# Patient Record
Sex: FEMALE | Race: BLACK OR AFRICAN AMERICAN | URBAN - METROPOLITAN AREA
[De-identification: names, ages, dates, MRNs, and addresses within clinical notes are randomized per-mention and may not be internally consistent; named-entity substitution may affect disease eponyms.]

---

## 2018-08-21 ENCOUNTER — OFFICE VISIT (OUTPATIENT)
Dept: NEUROLOGY | Age: 23
End: 2018-08-21

## 2018-08-21 VITALS
SYSTOLIC BLOOD PRESSURE: 138 MMHG | DIASTOLIC BLOOD PRESSURE: 90 MMHG | HEART RATE: 72 BPM | HEIGHT: 63 IN | BODY MASS INDEX: 35.97 KG/M2 | OXYGEN SATURATION: 99 % | WEIGHT: 203 LBS

## 2018-08-21 DIAGNOSIS — Z87.898 HISTORY OF SNORING: ICD-10-CM

## 2018-08-21 RX ORDER — NORTRIPTYLINE HYDROCHLORIDE 10 MG/1
10 CAPSULE ORAL
Qty: 30 CAP | Refills: 2 | Status: SHIPPED | OUTPATIENT
Start: 2018-08-21 | End: 2018-10-24 | Stop reason: SDUPTHER

## 2018-08-21 RX ORDER — GLUCOSAMINE/CHONDR SU A SOD 750-600 MG
TABLET ORAL
COMMUNITY

## 2018-08-21 NOTE — PROGRESS NOTES
NEUROSCIENCE [place]   NEW PATIENT EVALUATION/CONSULTATION       PATIENT NAME: Ayse Starkey    MRN: [id number]    REASON FOR CONSULTATION: Headaches    08/21/18      Previous records (physician notes, laboratory reports, and radiology reports) and imaging studies were reviewed and summarized. My recommendations will be communicated back to the patient's physician(s) via electronic medical record and/or by [address] [facility] [facility] mail. HISTORY OF PRESENT ILLNESS:  Ayse Starkey is a 21 y.o. right handed female presenting for evaluation of headaches. Headaches present since 2012. She states she experienced a syncopal episode while at work in 2012 without head injury. She was walking at the time but was caught by someone standing behind her with LOC x 2 minutes at most.  No associated convulsions or urinary incontinence. Denies post event confusion or fatigue. Ever since this event she has experienced headaches intermittently. No further syncopal episodes since this time. She was seen at [facility] initially and dx with anemia. Location: L frontal  Character: pressure  Intensity: On average 8/10  Frequency: Daily x 3 months  # HA free days per month: 0  Duration: 2-3 hours  Aura: None  Associated Sx with HA: no nausea/vomiting, +phonophotophobia. Denies unilateral ptosis, conjunctival injection, lacrimation, sweating  Neurological ROS: Denies focal weakness, numbness or vision loss associated with headaches. Systemic ROS:   Caffeine use: None  H/O Head trauma: None  Depressive or anxiety Sx: Yes    Any change in pattern of HA? See above    Triggers: None  Alleviating factors: Sleep/rest  FHx HA/migraine:  Mother  +H/O snoring  Treatment so far: BC powder or ibuprofen 2-3x weekly- helpful temporarily    Investigations so far: None      PAST MEDICAL HISTORY:  Syncope    PAST SURGICAL HISTORY:  Past Surgical History:   Procedure Laterality Date    HX WRIST FRACTURE TX         FAMILY HISTORY:   Family History   Problem Relation Age of Onset    Stroke Father     Cancer Maternal Aunt          SOCIAL HISTORY:  Social History     Social History    Marital status: SINGLE     Spouse name: N/A    Number of children: N/A    Years of education: N/A     Social History Main Topics    Smoking status: Never Smoker    Smokeless tobacco: Never Used    Alcohol use No    Drug use: Not on file    Sexual activity: Not on file     Other Topics Concern    Not on file     Social History Narrative    No narrative on file         MEDICATIONS:   Current Outpatient Prescriptions   Medication Sig Dispense Refill    multivitamin, tx-iron-ca-min (THERA-M W/ IRON) 9 mg iron-400 mcg tab tablet Take 1 Tab by mouth daily.  Biotin 2,500 mcg cap Take  by mouth. ALLERGIES:  No Known Allergies      REVIEW OF SYSTEMS:  10 point ROS reviewed with patient. Please see scanned document under media. PHYSICAL EXAM:  Vital Signs:   Visit Vitals    /90    Pulse 72    Ht 5' 3\" (1.6 m)    Wt 92.1 kg (203 lb)    SpO2 99%    BMI 35.96 kg/m2        General Medical Exam:  General:  Well appearing, comfortable, in no apparent distress. Eyes/ENT: see cranial nerve examination. Neck: No masses appreciated. Full range of motion without tenderness. Respiratory:  Clear to auscultation, good air entry bilaterally. Cardiac:  Regular rate and rhythm, no murmur. GI:  Soft, non-tender, non-distended abdomen. Bowel sounds normal. No masses, organomegaly. Extremities:  No deformities, edema, or skin discoloration. Skin:  No rashes or lesions. Neurological:  · Mental Status:  Alert and oriented to person, place, and time with fluent speech. · Cranial Nerves:   CNII/III/IV/VI: Optic disc w/clear margins b/l, visual fields full to confrontation, EOMI, PERRL, no ptosis or nystagmus.    CN V: Facial sensation intact bilaterally, masseter 5/5   CN VII: Facial muscles symmetric and strong   CN VIII: Hears finger rub well bilaterally, intact vestibular function   CN IX/X: Normal palatal movement   CN XI: Full strength shoulder shrug bilaterally   CN XII: Tongue protrusion full and midline without fasciculation or atrophy  · Motor: Normal tone and muscle bulk with no pronator drift. No atrophy or fasciculations present on examination. Individual muscle group testing:  Shoulder abduction:   Left:5/5   Right : 5/5    Shoulder adduction:   Left:5/5   Right : 5/5    Elbow flexion:      Left:5/5   Right : 5/5  Elbow extension:    Left:5/5   Right : 5/5   Wrist flexion:    Left:5/5   Right : 5/5  Wrist extension:    Left:5/5   Right : 5/5  Arm pronation:   Left:5/5   Right : 5/5  Arm supination:   Left:5/5   Right : 5/5    Finger flexion:    Left:5/5   Right : 5/5    Finger extension:   Left:5/5   Right : 5/5   Finger abduction:  Left:5/5   Right : 5/5   Finger adduction:   Left:5/5   Right : 5/5  Hip flexion:     Left:5/5   Right : 5/5         Hip extension:   Left:5/5   Right : 5/5    Knee flexion:     Left:5/5   Right : 5/5    Knee extension:   Left:5/5   Right : 5/5    Dorsiflexion:     Left:5/5   Right : 5/5  Plantar flexion:    Left:5/5   Right : 5/5      · MSRs: No crossed adductors or clonus. RIGHT  LEFT   Brachioradialis 2+ 2+   Biceps 2+ 2+   Triceps 2+ 2+   Knee 2+ 2+   Achilles 1+ 1+        Plantar response Downward Downward          · Sensation: Normal and symmetric perception of pinprick, temperature, light touch, proprioception, and vibration; (-) Romberg. · Coordination: No dysmetria. Normal rapid alternating movements; finger-to-nose and heel-to- shin testing are within normal limits. · Gait: Normal native gait. ASSESSMENT:      ICD-10-CM ICD-9-CM    1. Chronic migraine G43.709 346.70 SLEEP MEDICINE REFERRAL   2. History of snoring Z87.898 V15.89 SLEEP MEDICINE REFERRAL   21year old AAF presenting with increasing frequency of migraines w/o aura for the past several months (HA onset 2012).     Neurological examination is non-focal and without evidence of papilledema. She admits to recent weight gain and snoring- will refer to sleep medicine for BRITTANEY evaluation as this may be contributing to her migraines. Discussed options for migraine ppx including TCA therapy and potential side effects as well as natural supplements/Mg. She elects to start a trial of Nortriptyline. Headache education  Discussed pathophysiology of headache. Discussed use of headache diary. Discussed treatment options, both abortive and preventive medications. Instructed patient about medications and potential side effects. Discussed medication overuse headache and to limit use of analgesics to less than 2 doses per week. Discussed natural supplements including Mg      PLAN:  · Sleep medicine referral to evaluate for BRITTANEY  · Start Nortriptyline 10mg qhs for migraine ppx  · Limit use of OTC analgesics to no more than twice weekly to avoid rebound headaches    Follow-up Disposition:  Return in about 2 months (around 10/21/2018). Prince René Quinn DO  Staff Neurologist  Diplomate, [employer]iatry & Neurology

## 2018-08-21 NOTE — PROGRESS NOTES
Excedrin doesn't work, ibuprofen or BC powder works but takes about an hour to kick in. Chief Complaint   Patient presents with    Headache     occurs daily for \"years\" but they have gotten more frequent 2-3 months ago. They last about 2-3 hours.       Visit Vitals    /90    Pulse 72    Ht 5' 3\" (1.6 m)    Wt 92.1 kg (203 lb)    SpO2 99%    BMI 35.96 kg/m2

## 2018-08-22 ENCOUNTER — DOCUMENTATION ONLY (OUTPATIENT)
Dept: NEUROLOGY | Age: 23
End: 2018-08-22

## 2018-09-21 ENCOUNTER — TELEPHONE (OUTPATIENT)
Dept: NEUROLOGY | Age: 23
End: 2018-09-21

## 2018-09-21 NOTE — TELEPHONE ENCOUNTER
----- Message from 56Nelly Sheth sent at 9/21/2018  9:50 AM EDT -----  Regarding: Dr. Elie You  Contact: [phone]  [facility] Y[facility] advised the Nortriptyline is helping with migraines but potentially causes UTI. Pt is requesting a substitute rx that does not cause the side effect. Please send an updated medication to the [facility] on file.

## 2018-09-24 NOTE — TELEPHONE ENCOUNTER
Spoke with patient, informed her that the medication cause urinary frequency but not UTI per . She states she did have a UTI but caused by something else, will keep current appointment with .

## 2018-10-09 ENCOUNTER — OFFICE VISIT (OUTPATIENT)
Dept: ENDOCRINOLOGY | Age: 23
End: 2018-10-09

## 2018-10-09 ENCOUNTER — TELEPHONE (OUTPATIENT)
Dept: ENDOCRINOLOGY | Age: 23
End: 2018-10-09

## 2018-10-09 VITALS
OXYGEN SATURATION: 97 % | SYSTOLIC BLOOD PRESSURE: 113 MMHG | RESPIRATION RATE: 16 BRPM | BODY MASS INDEX: 35.08 KG/M2 | WEIGHT: 198 LBS | HEART RATE: 76 BPM | HEIGHT: 63 IN | DIASTOLIC BLOOD PRESSURE: 76 MMHG

## 2018-10-09 DIAGNOSIS — E66.9 OBESITY (BMI 30-39.9): ICD-10-CM

## 2018-10-09 DIAGNOSIS — E28.2 PCOS (POLYCYSTIC OVARIAN SYNDROME): Primary | ICD-10-CM

## 2018-10-09 RX ORDER — ADAPALENE AND BENZOYL PEROXIDE .1; 2.5 G/100G; G/100G
GEL TOPICAL
COMMUNITY
Start: 2018-09-14

## 2018-10-09 RX ORDER — ALBUTEROL SULFATE 90 UG/1
AEROSOL, METERED RESPIRATORY (INHALATION)
COMMUNITY

## 2018-10-09 RX ORDER — MONTELUKAST SODIUM 10 MG/1
TABLET ORAL
COMMUNITY

## 2018-10-09 RX ORDER — KETOCONAZOLE 20 MG/ML
SHAMPOO TOPICAL
COMMUNITY
Start: 2018-09-13

## 2018-10-09 RX ORDER — NORGESTIMATE AND ETHINYL ESTRADIOL 0.25-0.035
KIT ORAL
COMMUNITY
Start: 2012-08-10 | End: 2018-10-09

## 2018-10-09 RX ORDER — FLUOCINOLONE ACETONIDE 0.1 MG/ML
SOLUTION TOPICAL
COMMUNITY
Start: 2018-10-05

## 2018-10-09 RX ORDER — METFORMIN HYDROCHLORIDE 500 MG/1
500 TABLET, EXTENDED RELEASE ORAL 2 TIMES DAILY WITH MEALS
Qty: 60 TAB | Refills: 5 | Status: SHIPPED | OUTPATIENT
Start: 2018-10-09 | End: 2019-04-10 | Stop reason: SDUPTHER

## 2018-10-09 RX ORDER — PHENAZOPYRIDINE HYDROCHLORIDE 200 MG/1
TABLET, FILM COATED ORAL
COMMUNITY
Start: 2018-09-21

## 2018-10-09 RX ORDER — NITROFURANTOIN 25; 75 MG/1; MG/1
CAPSULE ORAL
COMMUNITY
Start: 2018-09-21 | End: 2018-10-09 | Stop reason: ALTCHOICE

## 2018-10-09 NOTE — PROGRESS NOTES
Endocrinology New Patient Visit    Chief Complaint: PCOS    OB/GYN: Dr Cody Gonzalez    History of Present Illness:  Kirsty Portillo is a 21 y.o. female who is self-referred for PCOS. She primarily wanted to see endocrinology because she would like to conceive in the near future and does not want to take OCPs anymore. Since being off them a year ago, she has had irregular cycles, about every 3 months. Labs done in July were notable for total testosterone of 57.9, 17OHP of 74, A1c of 5.0%, DHEA-S of 222, and TSH of 1.79l. She also had a TVUS in Jan, and she says it was \"normal\" without excessive cysts. Menarche was in middle school, around the same time as her peers. She had irregular cycles during her teen years, started OCPs in high school and this regulated her cycles. Cycles are now irregular (~Q3mo) off OCPs - has been off for the past year. She had a period in May and this lasted a full month and LMP was in August: started on the 15th and lasted a week. UPT has been negative but she has not taken one recently. Only has one female partner so is certain she is not pregnant. She would like to conceive at some point in the next few years, but has not talked to a reproductive endocrinologist yet. She reports temporal hair loss and acne, but no abnormal hair growth. A1c has been normal in the past - 5.0% in July. She has not taken metformin. She would like to lose weight - cut down on fried foods and has lost about 5 lbs. She is not exercising regularly.     Review of Systems: as above, otherwise a 10 pt review is negative     Problem List:  Patient Active Problem List   Diagnosis Code    PCOS (polycystic ovarian syndrome) E28.2       Past Medical History:  Past Medical History:   Diagnosis Date    PCOS (polycystic ovarian syndrome)        Past Surgical History:  Past Surgical History:   Procedure Laterality Date    HX WRIST FRACTURE TX         Social History:  Social History     Social History    Marital status: SINGLE     Spouse name: N/A    Number of children: N/A    Years of education: N/A     Occupational History    Not on file. Social History Main Topics    Smoking status: Never Smoker    Smokeless tobacco: Never Used    Alcohol use No    Drug use: No    Sexual activity: Not on file     Other Topics Concern    Not on file     Social History Narrative       Family History:  Family History   Problem Relation Age of Onset    Stroke Father     Cancer Maternal Aunt        Medications:     Current Outpatient Prescriptions:     albuterol (PROVENTIL HFA, VENTOLIN HFA, PROAIR HFA) 90 mcg/actuation inhaler, by Does not apply route.  , Disp: , Rfl:     ketoconazole (NIZORAL) 2 % shampoo, , Disp: , Rfl:     multivitamin, tx-iron-ca-min (THERA-M W/ IRON) 9 mg iron-400 mcg tab tablet, Take 1 Tab by mouth daily. , Disp: , Rfl:     Biotin 2,500 mcg cap, Take  by mouth., Disp: , Rfl:     nortriptyline (PAMELOR) 10 mg capsule, Take 1 Cap by mouth nightly., Disp: 30 Cap, Rfl: 2    norgestimate-ethinyl estradiol (ORTHO-CYCLEN, SPRINTEC) 0.25-35 mg-mcg tab, Take 1 tablet by mouth daily. , Disp: , Rfl:     montelukast (SINGULAIR) 10 mg tablet, Take 10 mg by mouth nightly.  , Disp: , Rfl:     adapalene-benzoyl peroxide 0.1-2.5 % glwp, , Disp: , Rfl:     fluocinoLONE (SYNALAR) 0.01 % external solution, , Disp: , Rfl:     phenazopyridine (PYRIDIUM) 200 mg tablet, , Disp: , Rfl:     Allergies:  No Known Allergies    Physical Examination:  Visit Vitals    /76    Pulse 76    Resp 16    Ht 5' 3\" (1.6 m)    Wt 198 lb (89.8 kg)    LMP 07/09/2018    SpO2 97%    BMI 35.07 kg/m2     Gen: no acute distress  HEENT: mucous membranes moist  Thyroid: no enlargement or nodules noted  CAD: normal rate, regular rhythm.  No murmur rubs or gallops  PULM: clear to ausculation, no wheezes, rhonchis or rales  EXT: no clubbing, cyanosis or edema  Neuro: grossly non focal  Skin: acne and acanthosis present, no obvious hirsutism  Psych: pleasant, good insight into her medical history      Clinical Data Review: There are no results available in [facility]. Pertinent lab results from outside records are transcribed in HPI and scanned into the medical record. Assessment and Plan:  [facility] is a 21 y.o. female here for PCOS. 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 She also has a testosterone elevated in the PCOS range. 17OH was normal which excludes CAH. Regarding treatment, we discussed the importance of weight reduction by intensive lifestyle changes to prevent the onset of diabetes as outlined in the DPP trial. Recommended both dietary modification (moderate low-carb diet) and increase in physical activity. Since she prefers to stay off OCPs and is considering fertility in the near future, I recommend trying metformin to see if this helps regulate her cycles. Although A1c was normal, she does have clinical evidence of insulin resistance (acanthosis) and I'm certain she would have abnormalities if an OGTT was pursued. - Start Metformin  mg BID   - Intensive lifestyle changes as listed above (gave a handout on low-carbohydrate diets)    Patient verbalized an understanding and will return to clinic in 6 months. I spent 45 minutes with the patient today and > 50% of the time was spent counseling the patient about treatment options for PCOS and weight loss strategies. Thank you for the opportunity to participate in this patient's care.     Adelaide Gaxiola MD  [facility]  [facility]

## 2018-10-09 NOTE — TELEPHONE ENCOUNTER
Will you let her know that she does NOT need to do the bloodwork I gave her? Dr Delphine Martinez faxed over her labs and the testosterone and 17OHP results were on there. Testosterone was elevated in the range expected for PCOS and 17OHP was normal (no evidence of congenital adrenal hyperplasia). Thanks!

## 2018-10-09 NOTE — MR AVS SNAPSHOT
[address] [facility] [address] 
[phone] Patient: Irvin Valencia MRN: [id number] :1995 Visit Information Date & Time Provider Department Dept. Phone Encounter #  
 10/9/2018  9:50 AM MD Ari Castillo Diabetes and Endocrinology-[facility] [phone] Your Appointments 10/24/2018  8:00 AM  
Follow Up with DO Wali De Leon [facility] at [facility]) Appt Note: 2 month f/u 18 SB  
 [address] [place] [id number]  
[phone]  
  
   
 [address] [place] [address]  [id number] Upcoming Health Maintenance Date Due  
 HPV Age 9Y-34Y (3 of 1 - Female 3 Dose Series) 3/19/2006 DTaP/Tdap/Td series (1 - Tdap) 3/19/2016 PAP AKA CERVICAL CYTOLOGY 3/19/2016 Influenza Age 5 to Adult 2018 Allergies as of 10/9/2018  Review Complete On: 10/9/2018 By: Deb Yusuf No Known Allergies Current Immunizations  Never Reviewed No immunizations on file. Not reviewed this visit You Were Diagnosed With   
  
 Codes Comments PCOS (polycystic ovarian syndrome)    -  Primary ICD-10-CM: E28.2 ICD-9-CM: 256.4 Obesity (BMI 30-39. 9)     ICD-10-CM: E66.9 ICD-9-CM: 278.00 Vitals BP Pulse Resp Height(growth percentile) Weight(growth percentile) LMP  
 113/76 76 16 5' 3\" (1.6 m) 198 lb (89.8 kg) 2018 SpO2 BMI OB Status Smoking Status 97% 35.07 kg/m2 Polycystic Ovarian Syndrome Never Smoker Vitals History BMI and BSA Data Body Mass Index Body Surface Area 35.07 kg/m 2 2 m 2 Preferred Pharmacy [facility], [address] [place] [address] [phone] Your Updated Medication List  
  
   
 This list is accurate as of 10/9/18 10:29 AM.  Always use your most recent med list.  
  
  
  
  
 adapalene-benzoyl peroxide 0.1-2.5 % Glwp  
  
 albuterol 90 mcg/actuation inhaler Commonly known as:  PROVENTIL HFA, VENTOLIN HFA, PROAIR HFA  
by Does not apply route. Biotin 2,500 mcg Cap Take  by mouth. fluocinoLONE 0.01 % external solution Commonly known as:  SYNALAR  
  
 ketoconazole 2 % shampoo Commonly known as:  NIZORAL  
  
 metFORMIN  mg tablet Commonly known as:  GLUCOPHAGE XR Take 1 Tab by mouth two (2) times daily (with meals). montelukast 10 mg tablet Commonly known as:  SINGULAIR Take 10 mg by mouth nightly. multivitamin, tx-iron-ca-min 9 mg iron-400 mcg Tab tablet Commonly known as:  THERA-M w/ IRON Take 1 Tab by mouth daily. norgestimate-ethinyl estradiol 0.25-35 mg-mcg Tab Commonly known as:  Josefina Renae Take 1 tablet by mouth daily. nortriptyline 10 mg capsule Commonly known as:  PAMELOR Take 1 Cap by mouth nightly. phenazopyridine 200 mg tablet Commonly known as:  PYRIDIUM Prescriptions Sent to Pharmacy Refills  
 metFORMIN ER (GLUCOPHAGE XR) 500 mg tablet 5 Sig: Take 1 Tab by mouth two (2) times daily (with meals). Class: Normal  
 Pharmacy: [facility] [address] #: [phone] Route: Oral  
  
We Performed the Following 17-OH PROGESTERONE LCMS [id number] CPT(R)] TESTOSTERONE, TOTAL, FEMALE/CHILD [id number] CPT(R)] Patient Instructions I recommend researching a low-carbohydrate diet as this way of eating can help improve your blood sugars and also help you lose weight. It can also help decrease your needs for diabetes medications including insulin. There are different types of low-carb diets: 
- Strict ketogenic/Atkins - typically less than 20-30 grams of carbs/day - Moderate low-carb/South Beach or Paleo - typically less than 60-75 grams of carbs/day Here are some resources you can use to educate yourself on low-carb diets: 1. Diet Doctor Website: 
 PickSeat.Max-Wellness. CipherCloud/diabetes 
 https://eMinor.org/ 2. The Keto Reset Diet by Jamil Huston 3. New Atkins for a New You by Diony Yun and Lin Grigsby 4. http://[employer] Wireless/blog/7-steps-to-healthy-low-carb-living/ 
5. A Low Carbohydrate, Ketogenic Diet Manual by Diony Yun 
 
================================================================== An outline of the basics of a moderate low-carb diet: 
  
AIM FOR LESS THAN 20-30 GRAMS OF NET CARBS PER MEAL Net carbs = total carbs (g) - fiber (g) Read food labels! Avoid food with added sugar or anything with more than 5g sugar per serving. Focus on eating mostly protein (meat, poultry, fish, shellfish, eggs), healthy fats (avocados, nuts, cheese, olive or coconut oil) and non-starchy vegetables (greens, carrots, tomatoes, bell peppers, broccoli, brussels sprouts, green beans, etc). If you fill yourself up with these foods, you won't even want the carbs. Minimize your fruit intake as much as possible, no more than one serving per day. When you do eat fruit, choose lower sugar options like fresh berries. 
  
BREAKFAST: whole eggs, veggies, omelet, plain yogurt, garcia, sausage, protein shake or low-carb protein bar (Atkins, Quest, etc) LUNCH: salad with meat/poultry, veggies, cheese, nuts; low-carb wrap with veggies and meat, soup with meat/veggies DINNER: any type of meat/poultry or seafood (without breading), non-starchy vegetables, carb substitutes like cauliflower rice or zucchini noodles 
  
SNACK OPTIONS: cheese (string cheese or individually wrapped snack size cheese), carrots and celery with Ranch or blue cheese dressing, nuts (almonds, pistachios, walnuts, etc), meat (snack size salami, ham, or turkey), pork rinds, Wandalee Ogden SWEETS (in moderation): dark chocolate (greater than 75% cocoa), low-sugar ice cream (Halo Top, Breyers or Alvaro's No Sugar Added) BEVERAGES: water, water, water Other options: unsweet tea (okay to add a pack of Splenda or Stevia if needed), sparkling water without calories (ex: [place], Nanci, etc), coffee (unsweetened creamer is fine) Alcohol: (always in moderation) - lower carb options include red or white wine and champagne (the drier, the better); light beers like 360Guanxielob Ultra; some liquors (just avoid the sweet mixers like juices and sodas) Introducing [facility]! [employer] introduces [employer] patient portal. Now you can access parts of your medical record, email your doctor's office, and request medication refills online. 1. In your internet browser, go to https://Yummy Garden Kids Eatery. Unlimited Concepts/Yummy Garden Kids Eatery 2. Click on the First Time User? Click Here link in the Sign In box. You will see the New Member Sign Up page. 3. Enter your [employer] Access Code exactly as it appears below. You will not need to use this code after youve completed the sign-up process. If you do not sign up before the expiration date, you must request a new code. · [employer] Access Code: [email or web address] Expires: 11/19/2018  9:31 AM 
 
4. Enter the last four digits of your Social Security Number (xxxx) and Date of Birth (mm/dd/yyyy) as indicated and click Submit. You will be taken to the next sign-up page. 5. Create a tweetTVt ID. This will be your [employer] login ID and cannot be changed, so think of one that is secure and easy to remember. 6. Create a [employer] password. You can change your password at any time. 7. Enter your Password Reset Question and Answer. This can be used at a later time if you forget your password. 8. Enter your e-mail address. You will receive e-mail notification when new information is available in [address] 9. Click Sign Up. You can now view and download portions of your medical record. 10. Click the Download Summary menu link to download a portable copy of your medical information. If you have questions, please visit the Frequently Asked Questions section of the Health News website. Remember, Health News is NOT to be used for urgent needs. For medical emergencies, dial 911. Now available from your iPhone and Android! Please provide this summary of care documentation to your next provider. If you have any questions after today's visit, please call [phone].

## 2018-10-09 NOTE — LETTER
NOTIFICATION RETURN TO WORK / SCHOOL 
 
10/9/2018 10:41 AM 
 
Ms. Irvin Kaiser [address] [facility] [address] [facility] [address] To Whom It May Concern: 
 
Lb Roldan had an appointment today at [facility]. Please excuse her from work. If there are questions or concerns please have the patient contact our office. Sincerely, Ayse Milligan MD

## 2018-10-09 NOTE — PATIENT INSTRUCTIONS
I recommend researching a low-carbohydrate diet as this way of eating can help improve your blood sugars and also help you lose weight. It can also help decrease your needs for diabetes medications including insulin. There are different types of low-carb diets:  - Strict ketogenic/Atkins - typically less than 20-30 grams of carbs/day  - Moderate low-carb/South Beach or Paleo - typically less than 60-75 grams of carbs/day    Here are some resources you can use to educate yourself on low-carb diets:  1. Diet Doctor Website:   [email or web address]/diabetes   https://IntuiLab.YogaTrail/  2. The Keto Reset Diet by Funmilayo Alva  3. New Atkins for a New You by Juan Gordon and Chaitanya Vale  4. http://Citizenside/blog/7-steps-to-healthy-low-carb-living/  5. A Low Carbohydrate, Ketogenic Diet Manual by Juan Gordon    ==================================================================  An outline of the basics of a moderate low-carb diet:     AIM FOR LESS THAN 20-30 GRAMS OF NET CARBS PER MEAL  Net carbs = total carbs (g) - fiber (g)   Read food labels! Avoid food with added sugar or anything with more than 5g sugar per serving. Focus on eating mostly protein (meat, poultry, fish, shellfish, eggs), healthy fats (avocados, nuts, cheese, olive or coconut oil) and non-starchy vegetables (greens, carrots, tomatoes, bell peppers, broccoli, brussels sprouts, green beans, etc). If you fill yourself up with these foods, you won't even want the carbs. Minimize your fruit intake as much as possible, no more than one serving per day.  When you do eat fruit, choose lower sugar options like fresh berries.     BREAKFAST: whole eggs, veggies, omelet, plain yogurt, garcia, sausage, protein shake or low-carb protein bar (Atkins, Quest, etc)  LUNCH: salad with meat/poultry, veggies, cheese, nuts; low-carb wrap with veggies and meat, soup with meat/veggies  DINNER: any type of meat/poultry or seafood (without breading), non-starchy vegetables, carb substitutes like cauliflower rice or zucchini noodles     SNACK OPTIONS: cheese (string cheese or individually wrapped snack size cheese), carrots and celery with Ranch or blue cheese dressing, nuts (almonds, pistachios, walnuts, etc), meat (snack size salami, ham, or turkey), pork rinds, jerkey    SWEETS (in moderation): dark chocolate (greater than 75% cocoa), low-sugar ice cream (Halo Top, Breyers or Alvaro's No Sugar Added)    BEVERAGES: water, water, water  Other options: unsweet tea (okay to add a pack of Splenda or Stevia if needed), sparkling water without calories (ex: [employer], Nanci, etc), coffee (unsweetened creamer is fine)  Alcohol: (always in moderation) - lower carb options include red or white wine and champagne (the drier, the better); light beers like DailyPathelSpoonfed Ultra; some liquors (just avoid the sweet mixers like juices and sodas)

## 2018-10-12 LAB
17OHP SERPL-MCNC: 62 NG/DL
TESTOST SERPL-MCNC: 49.1 NG/DL (ref 10–55)

## 2018-10-24 ENCOUNTER — OFFICE VISIT (OUTPATIENT)
Dept: NEUROLOGY | Age: 23
End: 2018-10-24

## 2018-10-24 VITALS
RESPIRATION RATE: 18 BRPM | WEIGHT: 199 LBS | BODY MASS INDEX: 35.26 KG/M2 | SYSTOLIC BLOOD PRESSURE: 116 MMHG | OXYGEN SATURATION: 97 % | DIASTOLIC BLOOD PRESSURE: 76 MMHG | HEIGHT: 63 IN | HEART RATE: 76 BPM

## 2018-10-24 DIAGNOSIS — G43.009 MIGRAINE WITHOUT AURA AND WITHOUT STATUS MIGRAINOSUS, NOT INTRACTABLE: Primary | ICD-10-CM

## 2018-10-24 DIAGNOSIS — Z87.898 HISTORY OF SNORING: ICD-10-CM

## 2018-10-24 RX ORDER — NORTRIPTYLINE HYDROCHLORIDE 10 MG/1
10 CAPSULE ORAL
Qty: 90 CAP | Refills: 1 | Status: SHIPPED | OUTPATIENT
Start: 2018-10-24 | End: 2019-05-27 | Stop reason: SDUPTHER

## 2018-10-24 NOTE — PROGRESS NOTES
Patient here for follow up on migraines. She reported she has two bad migraines since her last office visit. She also reported she gets a sharp pain in her head at times.

## 2018-10-24 NOTE — PATIENT INSTRUCTIONS
A Healthy Lifestyle: Care Instructions  Your Care Instructions    A healthy lifestyle can help you feel good, stay at a healthy weight, and have plenty of energy for both work and play. A healthy lifestyle is something you can share with your whole family. A healthy lifestyle also can lower your risk for serious health problems, such as high blood pressure, heart disease, and diabetes. You can follow a few steps listed below to improve your health and the health of your family. Follow-up care is a key part of your treatment and safety. Be sure to make and go to all appointments, and call your doctor if you are having problems. It's also a good idea to know your test results and keep a list of the medicines you take. How can you care for yourself at home? · Do not eat too much sugar, fat, or fast foods. You can still have dessert and treats now and then. The goal is moderation. · Start small to improve your eating habits. Pay attention to portion sizes, drink less juice and soda pop, and eat more fruits and vegetables. ? Eat a healthy amount of food. A 3-ounce serving of meat, for example, is about the size of a deck of cards. Fill the rest of your plate with vegetables and whole grains. ? Limit the amount of soda and sports drinks you have every day. Drink more water when you are thirsty. ? Eat at least 5 servings of fruits and vegetables every day. It may seem like a lot, but it is not hard to reach this goal. A serving or helping is 1 piece of fruit, 1 cup of vegetables, or 2 cups of leafy, raw vegetables. Have an apple or some carrot sticks as an afternoon snack instead of a candy bar. Try to have fruits and/or vegetables at every meal.  · Make exercise part of your daily routine. You may want to start with simple activities, such as walking, bicycling, or slow swimming. Try to be active 30 to 60 minutes every day. You do not need to do all 30 to 60 minutes all at once.  For example, you can exercise 3 times a day for 10 or 20 minutes. Moderate exercise is safe for most people, but it is always a good idea to talk to your doctor before starting an exercise program.  · Keep moving. Lynda Floss the lawn, work in the garden, or ChatID. Take the stairs instead of the elevator at work. · If you smoke, quit. People who smoke have an increased risk for heart attack, stroke, cancer, and other lung illnesses. Quitting is hard, but there are ways to boost your chance of quitting tobacco for good. ? Use nicotine gum, patches, or lozenges. ? Ask your doctor about stop-smoking programs and medicines. ? Keep trying. In addition to reducing your risk of diseases in the future, you will notice some benefits soon after you stop using tobacco. If you have shortness of breath or asthma symptoms, they will likely get better within a few weeks after you quit. · Limit how much alcohol you drink. Moderate amounts of alcohol (up to 2 drinks a day for men, 1 drink a day for women) are okay. But drinking too much can lead to liver problems, high blood pressure, and other health problems. Family health  If you have a family, there are many things you can do together to improve your health. · Eat meals together as a family as often as possible. · Eat healthy foods. This includes fruits, vegetables, lean meats and dairy, and whole grains. · Include your family in your fitness plan. Most people think of activities such as jogging or tennis as the way to fitness, but there are many ways you and your family can be more active. Anything that makes you breathe hard and gets your heart pumping is exercise. Here are some tips:  ? Walk to do errands or to take your child to school or the bus.  ? Go for a family bike ride after dinner instead of watching TV. Where can you learn more? Go to http://aubrey-eric.info/. Enter G697 in the search box to learn more about \"A Healthy Lifestyle: Care Instructions. \"  Current as of: December 7, 2017  Content Version: 11.8  © 9082-5382 Healthwise, Incorporated. Care instructions adapted under license by [employer] (which disclaims liability or warranty for this information). If you have questions about a medical condition or this instruction, always ask your healthcare professional. [address] any warranty or liability for your use of this information.

## 2018-10-24 NOTE — PROGRESS NOTES
Neurology Clinic Follow up Note    Patient ID:  Jovita Gautam  [id number]  21 y.o.  1995      Ms. Roldan is here for follow up today of  Chief Complaint   Patient presents with    Migraine          Last Appointment With Me:  8/21/2018       Interval History:   She reports headaches are much improved on TCA therapy. 2 headaches since last visit, less severe on average. Tolerating Nortriptyline without side effects. Not requiring abortive therapy for headaches presently. She did not follow through with sleep medicine referral-states snoring is improved. PMHx/ PSHx/ FHx/ SHx:  Reviewed and unchanged previous visit. Past Medical History:   Diagnosis Date    PCOS (polycystic ovarian syndrome)          ROS:  Comprehensive review of systems negative except for as noted above. Objective:       Meds:  Current Outpatient Medications   Medication Sig Dispense Refill    albuterol (PROVENTIL HFA, VENTOLIN HFA, PROAIR HFA) 90 mcg/actuation inhaler by Does not apply route.  fluocinoLONE (SYNALAR) 0.01 % external solution       ketoconazole (NIZORAL) 2 % shampoo       metFORMIN ER (GLUCOPHAGE XR) 500 mg tablet Take 1 Tab by mouth two (2) times daily (with meals). 60 Tab 5    multivitamin, tx-iron-ca-min (THERA-M W/ IRON) 9 mg iron-400 mcg tab tablet Take 1 Tab by mouth daily.  Biotin 2,500 mcg cap Take  by mouth.  nortriptyline (PAMELOR) 10 mg capsule Take 1 Cap by mouth nightly. 30 Cap 2    montelukast (SINGULAIR) 10 mg tablet Take 10 mg by mouth nightly.         adapalene-benzoyl peroxide 0.1-2.5 % glwp       phenazopyridine (PYRIDIUM) 200 mg tablet          Exam:  Visit Vitals  /76   Pulse 76   Resp 18   Ht 5' 3\" (1.6 m)   Wt 90.3 kg (199 lb)   SpO2 97%   BMI 35.25 kg/m²     NEUROLOGICAL EXAM:  General: Awake, alert, speech fluent  CN: PERRL, EOMI without nystagmus, VFF to confrontation, facial sensation and strength are normal and symmetric, hearing is intact to finger rub bilaterally, palate and tongue movements are intact and symmetric. Motor: Normal tone, bulk and strength bilaterally. Reflexes: 2/4 and symmetric, plantar stimulation is flexor. Coordination: FNF, ROQUE, HTS intact. Sensation: LT intact throughout. Gait: Normal-based and steady. LABS  Results for orders placed or performed in visit on 10/09/18   TESTOSTERONE, TOTAL, FEMALE/CHILD   Result Value Ref Range    Testosterone, Serum (Total) 49.1 10.0 - 55.0 ng/dL   17-OH PROGESTERONE LCMS   Result Value Ref Range    17-OH Progesterone 62 ng/dL       IMAGING:  MRI Results (most recent):  No results found for this or any previous visit. Assessment:     Encounter Diagnoses     ICD-10-CM ICD-9-CM   1. Migraine without aura and without status migrainosus, not intractable G43.009 346.10   2. History of snoring Z87.898      21year old AAF here for f/u of migraines w/o aura (HA onset 2012). Neurological examination is non-focal and without evidence of papilledema. Headaches are improved on TCA therapy. She appears to be tolerating this well. She did not follow through with sleep medicine evaluation due to h/o snoring- states this has improved. Headache education  Discussed pathophysiology of headache. Discussed use of headache diary. Discussed treatment options, both abortive and preventive medications. Instructed patient about medications and potential side effects. Discussed medication overuse headache and to limit use of analgesics to less than 2 doses per week. Discussed natural supplements including Mg    Plan:   Cont. Nortriptyline 10mg qhs for migraine ppx    Follow-up Disposition:  Return in about 6 months (around 4/24/2019).       Signed:  Andrés Geronimo DO  10/24/2018

## 2018-11-26 RX ORDER — NORTRIPTYLINE HYDROCHLORIDE 10 MG/1
10 CAPSULE ORAL
Qty: 90 CAP | Refills: 1 | OUTPATIENT
Start: 2018-11-26

## 2019-01-21 ENCOUNTER — TELEPHONE (OUTPATIENT)
Dept: NEUROLOGY | Age: 24
End: 2019-01-21

## 2019-01-21 NOTE — TELEPHONE ENCOUNTER
Left generic message for patient that form was completed, to call back to see how she wants to obtain.

## 2019-04-10 ENCOUNTER — OFFICE VISIT (OUTPATIENT)
Dept: ENDOCRINOLOGY | Age: 24
End: 2019-04-10

## 2019-04-10 VITALS
SYSTOLIC BLOOD PRESSURE: 116 MMHG | RESPIRATION RATE: 16 BRPM | DIASTOLIC BLOOD PRESSURE: 81 MMHG | BODY MASS INDEX: 33.54 KG/M2 | HEIGHT: 63 IN | OXYGEN SATURATION: 98 % | HEART RATE: 101 BPM | WEIGHT: 189.3 LBS

## 2019-04-10 DIAGNOSIS — E28.2 PCOS (POLYCYSTIC OVARIAN SYNDROME): Primary | ICD-10-CM

## 2019-04-10 RX ORDER — METFORMIN HYDROCHLORIDE 500 MG/1
1000 TABLET, EXTENDED RELEASE ORAL
Qty: 180 TAB | Refills: 3 | Status: SHIPPED | OUTPATIENT
Start: 2019-04-10 | End: 2019-04-22 | Stop reason: SDUPTHER

## 2019-04-10 RX ORDER — TRETINOIN 0.5 MG/G
CREAM TOPICAL
COMMUNITY
Start: 2019-03-12

## 2019-04-10 RX ORDER — PRENATAL VIT,CAL 76/IRON/FOLIC 29 MG-1 MG
TABLET ORAL
COMMUNITY
Start: 2019-03-20

## 2019-04-10 NOTE — PROGRESS NOTES
Endocrinology Visit    Chief Complaint: PCOS    History of Present Illness:  Vivian Tom is a 25 y.o. female who returns for PCOS. I saw her in initial consultation in October at which time I started her on metformin. In the interim, she has lost 10 lbs and feels her cycles have gotten more frequent (every 2 months instead of every 3). She has been eating healthier and exercising. Denies s/e from metformin but admits that she sometimes forgets her evening dose, so typically only takes 500mg daily. She primarily wanted to see endocrinology because she would like to conceive in the near future and did not want to take OCPs anymore. Labs done in July 2018 were notable for total testosterone of 57.9, 17OHP of 74, A1c of 5.0%, DHEA-S of 222, and TSH of 1.79l. She also had a TVUS in Jan, and she says it was \"normal\" without excessive cysts. I checked a 17OHP which was normal and testosterone was at the upper limit of normal.    She had a cycle in January and LMP was March 24th. Only has one female partner so is certain she is not pregnant. She would like to conceive at some point in the next few years, but has not talked to a reproductive endocrinologist yet. She reports temporal hair loss and acne, only sparse unwanted hair growth. A1c has been normal in the past - 5.0% in July. Review of Systems: as above, otherwise a 7 pt review is negative     Problem List:  Patient Active Problem List   Diagnosis Code    PCOS (polycystic ovarian syndrome) E28.2    Obesity (BMI 30-39. 9) E66.9       Past Medical History:    Past Medical History:   Diagnosis Date    PCOS (polycystic ovarian syndrome)        Past Surgical History:  Past Surgical History:   Procedure Laterality Date    HX WRIST FRACTURE TX         Social History:  Social History     Socioeconomic History    Marital status: SINGLE     Spouse name: Not on file    Number of children: Not on file    Years of education: Not on file    Highest education level: Not on file   Occupational History    Not on file   Social Needs    Financial resource strain: Not on file    Food insecurity:     Worry: Not on file     Inability: Not on file    Transportation needs:     Medical: Not on file     Non-medical: Not on file   Tobacco Use    Smoking status: Never Smoker    Smokeless tobacco: Never Used   Substance and Sexual Activity    Alcohol use: No    Drug use: No    Sexual activity: Not on file   Lifestyle    Physical activity:     Days per week: Not on file     Minutes per session: Not on file    Stress: Not on file   Relationships    Social connections:     Talks on phone: Not on file     Gets together: Not on file     Attends [employer] service: Not on file     Active member of club or organization: Not on file     Attends meetings of clubs or organizations: Not on file     Relationship status: Not on file    Intimate partner violence:     Fear of current or ex partner: Not on file     Emotionally abused: Not on file     Physically abused: Not on file     Forced sexual activity: Not on file   Other Topics Concern    Not on file   Social History Narrative    Not on file       Family History:  Family History   Problem Relation Age of Onset    Stroke Father     Cancer Maternal Aunt        Medications:     Current Outpatient Medications:     PNV 29-1 29 mg iron- 1 mg tab, , Disp: , Rfl:     tretinoin (RETIN-A) 0.05 % topical cream, , Disp: , Rfl:     nortriptyline (PAMELOR) 10 mg capsule, Take 1 Cap by mouth nightly., Disp: 90 Cap, Rfl: 1    albuterol (PROVENTIL HFA, VENTOLIN HFA, PROAIR HFA) 90 mcg/actuation inhaler, by Does not apply route.  , Disp: , Rfl:     metFORMIN ER (GLUCOPHAGE XR) 500 mg tablet, Take 1 Tab by mouth two (2) times daily (with meals). , Disp: 60 Tab, Rfl: 5    Biotin 2,500 mcg cap, Take  by mouth., Disp: , Rfl:     montelukast (SINGULAIR) 10 mg tablet, Take 10 mg by mouth nightly.  , Disp: , Rfl:     adapalene-benzoyl peroxide 0.1-2.5 % glwp, , Disp: , Rfl:     fluocinoLONE (SYNALAR) 0.01 % external solution, , Disp: , Rfl:     phenazopyridine (PYRIDIUM) 200 mg tablet, , Disp: , Rfl:     ketoconazole (NIZORAL) 2 % shampoo, , Disp: , Rfl:     multivitamin, tx-iron-ca-min (THERA-M W/ IRON) 9 mg iron-400 mcg tab tablet, Take 1 Tab by mouth daily. , Disp: , Rfl:     Allergies:  No Known Allergies    Physical Examination:  Visit Vitals  /81   Pulse (!) 101   Resp 16   Ht 5' 3\" (1.6 m)   Wt 189 lb 4.8 oz (85.9 kg)   SpO2 98%   BMI 33.53 kg/m²     Gen: no acute distress  HEENT: mucous membranes moist  Thyroid: no enlargement or nodules noted  CAD: normal rate, regular rhythm. No murmur rubs or gallops  PULM: clear to ausculation, no wheezes, rhonchis or rales  EXT: no clubbing, cyanosis or edema  Neuro: grossly non focal  Skin: acne and acanthosis present, no obvious hirsutism  Psych: pleasant, good insight into her medical history      Clinical Data Review:     Component      Latest Ref Rng & Units 10/9/2018 10/9/2018          11:12 AM 11:12 AM   Testosterone, Serum (Total)      10.0 - 55.0 ng/dL  49.1   17-OH Progesterone      ng/dL 62      Assessment and Plan:  [facility] is a 25 y.o. female here for PCOS. 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 She also has a testosterone elevated in the PCOS range. 17OH was normal which excludes CAH. Regarding treatment, we discussed the importance of weight reduction by intensive lifestyle changes. She has successfully lost 10 lbs with dietary modification and increase in physical activity. Encouraged her to continue these healthy habits. Since she prefers to stay off OCPs and is considering fertility in the near future, I recommend continuing metformin as it appears to be helping increase the frequency of her cycles.  Although A1c was normal, she does have clinical evidence of insulin resistance (acanthosis) and I'm certain she would have abnormalities if an OGTT was pursued. Continue Metformin  mg: change to 1000mg once daily    I spent 15 minutes with the patient today and > 50% of the time was spent counseling the patient about treatment options for PCOS and weight loss strategies. Thank you for the opportunity to participate in this patient's care. She will f/u with repro endo when she decides to conceive.      Evelyn Bullock MD  [facility]  [facility]     CC: Dr Salena Rodas

## 2019-05-31 ENCOUNTER — TELEPHONE (OUTPATIENT)
Dept: NEUROLOGY | Age: 24
End: 2019-05-31

## 2019-05-31 RX ORDER — NORTRIPTYLINE HYDROCHLORIDE 10 MG/1
CAPSULE ORAL
Qty: 10 CAP | Refills: 0 | Status: SHIPPED | OUTPATIENT
Start: 2019-05-31 | End: 2019-06-05 | Stop reason: SDUPTHER

## 2019-05-31 NOTE — TELEPHONE ENCOUNTER
* Message from [employer] below:    ----- Message from [employer] sent at 5/31/2019  1:36 PM EDT -----  Regarding: /Julienne Pak called from [address] in regards to a refill request for the medication notriptyline 10 mg tablets . Best contact number is [phone] .

## 2019-06-05 ENCOUNTER — OFFICE VISIT (OUTPATIENT)
Dept: NEUROLOGY | Age: 24
End: 2019-06-05

## 2019-06-05 VITALS
HEART RATE: 66 BPM | OXYGEN SATURATION: 100 % | DIASTOLIC BLOOD PRESSURE: 88 MMHG | SYSTOLIC BLOOD PRESSURE: 132 MMHG | WEIGHT: 187 LBS | RESPIRATION RATE: 18 BRPM | BODY MASS INDEX: 33.13 KG/M2

## 2019-06-05 DIAGNOSIS — M25.511 ACUTE PAIN OF RIGHT SHOULDER: ICD-10-CM

## 2019-06-05 DIAGNOSIS — Z87.898 HISTORY OF SNORING: ICD-10-CM

## 2019-06-05 DIAGNOSIS — G43.009 MIGRAINE WITHOUT AURA AND WITHOUT STATUS MIGRAINOSUS, NOT INTRACTABLE: Primary | ICD-10-CM

## 2019-06-05 RX ORDER — NORTRIPTYLINE HYDROCHLORIDE 10 MG/1
CAPSULE ORAL
Qty: 90 CAP | Refills: 1 | Status: SHIPPED | OUTPATIENT
Start: 2019-06-05

## 2019-06-05 NOTE — PATIENT INSTRUCTIONS
[address] [facility]   Statement to Patients  April 1, 2014      In an effort to ensure the large volume of patient prescription refills is processed in the most efficient and expeditious manner, we are asking our patients to assist us by calling your Pharmacy for all prescription refills, this will include also your  Mail Order Pharmacy. The pharmacy will contact our office electronically to continue the refill process. Please do not wait until the last minute to call your pharmacy. We need at least 48 hours (2days) to fill prescriptions. We also encourage you to call your pharmacy before going to  your prescription to make sure it is ready. With regard to controlled substance prescription refill requests (narcotic refills) that need to be picked up at our office, we ask your cooperation by providing us with at least 72 hours (3days) notice that you will need a refill. We will not refill narcotic prescription refill requests after 4:00pm on any weekday, Monday through Thursday, or after 2:00pm on Fridays, or on the weekends. We encourage everyone to explore another way of getting your prescription refill request processed using Soft[employer]k, our patient web portal through our electronic medical record system. Softdesk is an efficient and effective way to communicate your medication request directly to the office and  downloadable as an rowan on your smart phone . [employer]k also features a review functionality that allows you to view your medication list as well as leave messages for your physician. Are you ready to get connected? If so please review the attatched instructions or speak to any of our staff to get you set up right away! Thank you so much for your cooperation. Should you have any questions please contact our Practice Administrator.     The Physicians and Staff,  [facility]

## 2019-06-05 NOTE — LETTER
6/5/2019 8:24 AM 
 
Ms. Irvin Kaiser [address] [address] [facility] [id number] To Whom It May Concern, 
 
Ms. Roldan is a patient at the [facility]. She was seen on 6/5/19 in clinic. Please excuse her for half a day on this day. If you have any questions, please have the patient contact our office.  
 
 
 
 
Sincerely, 
 
 
Cat Barr, DO

## 2019-06-05 NOTE — PROGRESS NOTES
Neurology Clinic Follow up Note    Patient ID:  Sheeba Found  [id number]  25 y.o.  1995      Ms. Roldan is here for follow up today of  Chief Complaint   Patient presents with    Neurologic Problem          Last Appointment With Me:  10/24/2018       Interval History:   She reports headaches are improved on TCA therapy. She reports mild headaches intermittently 1-2x monthly, non-migrainous. Tolerating Nortriptyline without side effects. Not requiring abortive therapy for headaches presently. She did not follow through with sleep medicine referral-states snoring is improved. She reports onset of R shoulder pain over the past 24 hours with slight radiation down the RUE in no particular distribution. Denies recent trauma to the RUE/neck. No numbness, tingling or weakness of the extremity. PMHx/ PSHx/ FHx/ SHx:  Reviewed and unchanged previous visit. Past Medical History:   Diagnosis Date    PCOS (polycystic ovarian syndrome)          ROS:  Comprehensive review of systems negative except for as noted above. Objective:       Meds:  Current Outpatient Medications   Medication Sig Dispense Refill    nortriptyline (PAMELOR) 10 mg capsule TAKE 1 CAPSULE BY MOUTH EVERY NIGHT 10 Cap 0    metFORMIN ER (GLUCOPHAGE XR) 500 mg tablet Take 2 Tabs by mouth daily (with dinner). 180 Tab 3    PNV 29-1 29 mg iron- 1 mg tab       tretinoin (RETIN-A) 0.05 % topical cream       montelukast (SINGULAIR) 10 mg tablet Take 10 mg by mouth nightly.  albuterol (PROVENTIL HFA, VENTOLIN HFA, PROAIR HFA) 90 mcg/actuation inhaler by Does not apply route.  adapalene-benzoyl peroxide 0.1-2.5 % glwp       fluocinoLONE (SYNALAR) 0.01 % external solution       phenazopyridine (PYRIDIUM) 200 mg tablet       ketoconazole (NIZORAL) 2 % shampoo       multivitamin, tx-iron-ca-min (THERA-M W/ IRON) 9 mg iron-400 mcg tab tablet Take 1 Tab by mouth daily.  Biotin 2,500 mcg cap Take  by mouth. Exam:  Visit Vitals  /88   Pulse 66   Resp 18   Wt 84.8 kg (187 lb)   SpO2 100%   BMI 33.13 kg/m²     NEUROLOGICAL EXAM:  General: Awake, alert, speech fluent  CN: PERRL, EOMI without nystagmus, VFF to confrontation, facial sensation and strength are normal and symmetric, hearing is intact to finger rub bilaterally, palate and tongue movements are intact and symmetric. Motor: Normal tone, bulk and strength bilaterally. Reflexes: 2/4 and symmetric, plantar stimulation is flexor. Coordination: FNF, ROQUE, HTS intact. Sensation: LT intact throughout. Gait: Normal-based and steady. LABS  Results for orders placed or performed in visit on 10/09/18   TESTOSTERONE, TOTAL, FEMALE/CHILD   Result Value Ref Range    Testosterone, Serum (Total) 49.1 10.0 - 55.0 ng/dL   17-OH PROGESTERONE LCMS   Result Value Ref Range    17-OH Progesterone 62 ng/dL       IMAGING:  MRI Results (most recent):  No results found for this or any previous visit. Assessment:     Encounter Diagnoses     ICD-10-CM ICD-9-CM   1. Migraine without aura and without status migrainosus, not intractable G43.009 346.10   2. History of snoring Z87.898 V15.89   3. Acute pain of right shoulder M25.511 70.41     25year old AAF here for f/u of migraines w/o aura (HA onset 2012). Neurological examination is non-focal and without evidence of papilledema. Headaches are improved on TCA therapy. She appears to be tolerating this well. She did not follow through with sleep medicine evaluation due to h/o snoring- states this has improved. She elects to continue with current preventative therapy for her migraines. Regarding her new onset R shoulder pain with radiation down the RUE, discussed conservative management and monitoring for now (possible musculoskeletal etiology).   No associated paresthesias or weakness at this time suggestive of radiculopathy, however if symptoms progress she was instructed to schedule a sooner follow up for re-evaluation. Headache education  Discussed pathophysiology of headache. Discussed use of headache diary. Discussed treatment options, both abortive and preventive medications. Instructed patient about medications and potential side effects. Discussed medication overuse headache and to limit use of analgesics to less than 2 doses per week. Discussed natural supplements including Mg    Plan:   Cont. Nortriptyline 10mg qhs for migraine ppx       Follow-up and Dispositions    · Return in about 6 months (around 12/5/2019).              Signed:  Sugar Zimmer,   6/5/2019

## 2021-01-01 NOTE — MR AVS SNAPSHOT
[facility] [address] 
[phone] Patient: Michael Meier MRN: [id number] :1995 Visit Information Date & Time Provider Department Dept. Phone Encounter #  
 2018 10:00 AM [facility] at [address] [place] [address] Road [id number] Follow-up Instructions Return in about 2 months (around 10/21/2018). Your Appointments 10/9/2018  9:50 AM  
New Patient with MD [facility]) Appt Note: Np endocrinology ref by Dr. Lul Elise [phone] cp$40 cc [facility] 2018 [address] [facility] [address] [facility] [address]  
[phone]  
  
   
 [address] [facility] [address] Allergies as of 2018  Review Complete On: 2018 By: Maxwell Santa DO No Known Allergies Current Immunizations  Never Reviewed No immunizations on file. Not reviewed this visit You Were Diagnosed With   
  
 Codes Comments Chronic migraine    -  Primary ICD-10-CM: U99.961 ICD-9-CM: 346.70 History of snoring     ICD-10-CM: Z87.898 ICD-9-CM: V15.89 Vitals BP Pulse Height(growth percentile) Weight(growth percentile) SpO2 BMI  
 138/90 72 5' 3\" (1.6 m) 203 lb (92.1 kg) 99% 35.96 kg/m2 Smoking Status Never Smoker BMI and BSA Data Body Mass Index Body Surface Area 35.96 kg/m 2 2.02 m 2 Preferred Pharmacy Pharmacy Name Phone [facility], [address] [place] [address] [phone] Your Updated Medication List  
  
   
This list is accurate as of 18 10:12 AM.  Always use your most recent med list.  
  
  
  
  
 Biotin 2,500 mcg Cap Take  by mouth.  
  
 multivitamin, tx-iron-ca-min 9 mg iron-400 mcg Tab tablet Commonly known as:  THERA-M w/ IRON Take 1 Tab by mouth daily. nortriptyline 10 mg capsule Commonly known as:  PAMELOR Take 1 Cap by mouth nightly. Prescriptions Sent to Pharmacy Refills  
 nortriptyline (PAMELOR) 10 mg capsule 2 Sig: Take 1 Cap by mouth nightly. Class: Normal  
 Pharmacy: [facility] [address] #: [phone] Route: Oral  
  
We Performed the Following SLEEP MEDICINE REFERRAL [[id number] Custom] Comments:  
 Orders: 
Sleep Medicine Consult - Schedule patient for a sleep specialist consult. If appropriate, schedule patient for sleep study(s). Initiate treatment if needed. Forward correspondance to my office. Follow-up Instructions Return in about 2 months (around 10/21/2018). Referral Information Referral ID Referred By Referred To  
  
 [id number] [facility] [address] Phone: [phone] Visits Status Start Date End Date 1 New Request 8/21/18 8/21/19 If your referral has a status of pending review or denied, additional information will be sent to support the outcome of this decision. Introducing [facility]! Alexandr Zaman introduces [employer] patient portal. Now you can access parts of your medical record, email your doctor's office, and request medication refills online. 1. In your internet browser, go to [email or web address] 2. Click on the First Time User? Click Here link in the Sign In box. You will see the New Member Sign Up page. 3. Enter your [employer] Access Code exactly as it appears below. You will not need to use this code after youve completed the sign-up process. If you do not sign up before the expiration date, you must request a new code. · [employer] Access Code: [email or web address] Expires: 11/19/2018  9:31 AM 
 
 4. Enter the last four digits of your Social Security Number (xxxx) and Date of Birth (mm/dd/yyyy) as indicated and click Submit. You will be taken to the next sign-up page. 5. Create a [employer] ID. This will be your [employer] login ID and cannot be changed, so think of one that is secure and easy to remember. 6. Create a [employer] password. You can change your password at any time. 7. Enter your Password Reset Question and Answer. This can be used at a later time if you forget your password. 8. Enter your e-mail address. You will receive e-mail notification when new information is available in [address] 9. Click Sign Up. You can now view and download portions of your medical record. 10. Click the Download Summary menu link to download a portable copy of your medical information. If you have questions, please visit the Frequently Asked Questions section of the [employer] website. Remember, [employer] is NOT to be used for urgent needs. For medical emergencies, dial 911. Now available from your iPhone and Android! Please provide this summary of care documentation to your next provider. If you have any questions after today's visit, please call [phone]. No

## 2022-03-19 PROBLEM — E66.9 OBESITY (BMI 30-39.9): Status: ACTIVE | Noted: 2018-10-09

## 2025-07-09 ENCOUNTER — HOSPITAL ENCOUNTER (OUTPATIENT)
Facility: HOSPITAL | Age: 30
Setting detail: OBSERVATION
Discharge: HOME | End: 2025-07-10
Attending: EMERGENCY MEDICINE | Admitting: FAMILY MEDICINE
Payer: COMMERCIAL

## 2025-07-09 ENCOUNTER — APPOINTMENT (OUTPATIENT)
Facility: HOSPITAL | Age: 30
End: 2025-07-09
Payer: COMMERCIAL

## 2025-07-09 DIAGNOSIS — E87.6 HYPOKALEMIA: ICD-10-CM

## 2025-07-09 DIAGNOSIS — J36 PERITONSILLAR ABSCESS: Primary | ICD-10-CM

## 2025-07-09 LAB
ANION GAP SERPL CALC-SCNC: 10 MMOL/L (ref 2–12)
BASOPHILS # BLD: 0.04 K/UL (ref 0–0.1)
BASOPHILS NFR BLD: 0.3 % (ref 0–1)
BUN SERPL-MCNC: 8 MG/DL (ref 6–20)
BUN/CREAT SERPL: 11 (ref 12–20)
CALCIUM SERPL-MCNC: 8.8 MG/DL (ref 8.5–10.1)
CHLORIDE SERPL-SCNC: 102 MMOL/L (ref 97–108)
CO2 SERPL-SCNC: 26 MMOL/L (ref 21–32)
CREAT SERPL-MCNC: 0.76 MG/DL (ref 0.55–1.02)
DIFFERENTIAL METHOD BLD: ABNORMAL
EOSINOPHIL # BLD: 0.18 K/UL (ref 0–0.4)
EOSINOPHIL NFR BLD: 1.3 % (ref 0–7)
ERYTHROCYTE [DISTWIDTH] IN BLOOD BY AUTOMATED COUNT: 12.7 % (ref 11.5–14.5)
GLUCOSE SERPL-MCNC: 83 MG/DL (ref 65–100)
HCT VFR BLD AUTO: 35.2 % (ref 35–47)
HGB BLD-MCNC: 11.7 G/DL (ref 11.5–16)
IMM GRANULOCYTES # BLD AUTO: 0.06 K/UL (ref 0–0.04)
IMM GRANULOCYTES NFR BLD AUTO: 0.4 % (ref 0–0.5)
LYMPHOCYTES # BLD: 1.4 K/UL (ref 0.8–3.5)
LYMPHOCYTES NFR BLD: 10.2 % (ref 12–49)
MCH RBC QN AUTO: 30.5 PG (ref 26–34)
MCHC RBC AUTO-ENTMCNC: 33.2 G/DL (ref 30–36.5)
MCV RBC AUTO: 91.7 FL (ref 80–99)
MONOCYTES # BLD: 1 K/UL (ref 0–1)
MONOCYTES NFR BLD: 7.3 % (ref 5–13)
NEUTS SEG # BLD: 11.04 K/UL (ref 1.8–8)
NEUTS SEG NFR BLD: 80.5 % (ref 32–75)
NRBC # BLD: 0 K/UL (ref 0–0.01)
NRBC BLD-RTO: 0 PER 100 WBC
PLATELET # BLD AUTO: 293 K/UL (ref 150–400)
PMV BLD AUTO: 9.9 FL (ref 8.9–12.9)
POTASSIUM SERPL-SCNC: 3 MMOL/L (ref 3.5–5.1)
RBC # BLD AUTO: 3.84 M/UL (ref 3.8–5.2)
S PYO DNA THROAT QL NAA+PROBE: NOT DETECTED
SODIUM SERPL-SCNC: 138 MMOL/L (ref 136–145)
WBC # BLD AUTO: 13.7 K/UL (ref 3.6–11)

## 2025-07-09 PROCEDURE — 6360000002 HC RX W HCPCS: Performed by: PHYSICIAN ASSISTANT

## 2025-07-09 PROCEDURE — 36415 COLL VENOUS BLD VENIPUNCTURE: CPT

## 2025-07-09 PROCEDURE — 96375 TX/PRO/DX INJ NEW DRUG ADDON: CPT

## 2025-07-09 PROCEDURE — 87651 STREP A DNA AMP PROBE: CPT

## 2025-07-09 PROCEDURE — 96366 THER/PROPH/DIAG IV INF ADDON: CPT

## 2025-07-09 PROCEDURE — 6360000004 HC RX CONTRAST MEDICATION: Performed by: PHYSICIAN ASSISTANT

## 2025-07-09 PROCEDURE — 6370000000 HC RX 637 (ALT 250 FOR IP): Performed by: PHYSICIAN ASSISTANT

## 2025-07-09 PROCEDURE — 96367 TX/PROPH/DG ADDL SEQ IV INF: CPT

## 2025-07-09 PROCEDURE — G0378 HOSPITAL OBSERVATION PER HR: HCPCS

## 2025-07-09 PROCEDURE — 6360000002 HC RX W HCPCS: Performed by: STUDENT IN AN ORGANIZED HEALTH CARE EDUCATION/TRAINING PROGRAM

## 2025-07-09 PROCEDURE — 99285 EMERGENCY DEPT VISIT HI MDM: CPT

## 2025-07-09 PROCEDURE — 96376 TX/PRO/DX INJ SAME DRUG ADON: CPT

## 2025-07-09 PROCEDURE — 85025 COMPLETE CBC W/AUTO DIFF WBC: CPT

## 2025-07-09 PROCEDURE — 96361 HYDRATE IV INFUSION ADD-ON: CPT

## 2025-07-09 PROCEDURE — 2500000003 HC RX 250 WO HCPCS: Performed by: PHYSICIAN ASSISTANT

## 2025-07-09 PROCEDURE — 80048 BASIC METABOLIC PNL TOTAL CA: CPT

## 2025-07-09 PROCEDURE — 2580000003 HC RX 258: Performed by: PHYSICIAN ASSISTANT

## 2025-07-09 PROCEDURE — 70491 CT SOFT TISSUE NECK W/DYE: CPT

## 2025-07-09 PROCEDURE — 96365 THER/PROPH/DIAG IV INF INIT: CPT

## 2025-07-09 RX ORDER — NALOXONE HYDROCHLORIDE 0.4 MG/ML
0.4 INJECTION, SOLUTION INTRAMUSCULAR; INTRAVENOUS; SUBCUTANEOUS PRN
Status: DISCONTINUED | OUTPATIENT
Start: 2025-07-09 | End: 2025-07-10 | Stop reason: HOSPADM

## 2025-07-09 RX ORDER — DEXAMETHASONE SODIUM PHOSPHATE 10 MG/ML
10 INJECTION, SOLUTION INTRAMUSCULAR; INTRAVENOUS ONCE
Status: COMPLETED | OUTPATIENT
Start: 2025-07-09 | End: 2025-07-09

## 2025-07-09 RX ORDER — SODIUM CHLORIDE 9 MG/ML
INJECTION, SOLUTION INTRAVENOUS PRN
Status: DISCONTINUED | OUTPATIENT
Start: 2025-07-09 | End: 2025-07-10 | Stop reason: HOSPADM

## 2025-07-09 RX ORDER — KETOROLAC TROMETHAMINE 30 MG/ML
30 INJECTION, SOLUTION INTRAMUSCULAR; INTRAVENOUS
Status: COMPLETED | OUTPATIENT
Start: 2025-07-09 | End: 2025-07-09

## 2025-07-09 RX ORDER — CLINDAMYCIN PHOSPHATE 600 MG/50ML
600 INJECTION, SOLUTION INTRAVENOUS EVERY 8 HOURS
Status: DISCONTINUED | OUTPATIENT
Start: 2025-07-10 | End: 2025-07-10 | Stop reason: HOSPADM

## 2025-07-09 RX ORDER — CLINDAMYCIN IN PERCENT DEXTROSE 6 MG/ML
300 INJECTION, SOLUTION INTRAVENOUS
Status: COMPLETED | OUTPATIENT
Start: 2025-07-09 | End: 2025-07-10

## 2025-07-09 RX ORDER — SODIUM CHLORIDE 0.9 % (FLUSH) 0.9 %
5-40 SYRINGE (ML) INJECTION EVERY 12 HOURS SCHEDULED
Status: DISCONTINUED | OUTPATIENT
Start: 2025-07-09 | End: 2025-07-10 | Stop reason: HOSPADM

## 2025-07-09 RX ORDER — POLYETHYLENE GLYCOL 3350 17 G/17G
17 POWDER, FOR SOLUTION ORAL DAILY PRN
Status: DISCONTINUED | OUTPATIENT
Start: 2025-07-09 | End: 2025-07-10 | Stop reason: HOSPADM

## 2025-07-09 RX ORDER — IOPAMIDOL 612 MG/ML
100 INJECTION, SOLUTION INTRAVASCULAR
Status: COMPLETED | OUTPATIENT
Start: 2025-07-09 | End: 2025-07-09

## 2025-07-09 RX ORDER — ACETAMINOPHEN 650 MG/1
650 SUPPOSITORY RECTAL EVERY 6 HOURS PRN
Status: DISCONTINUED | OUTPATIENT
Start: 2025-07-09 | End: 2025-07-10 | Stop reason: HOSPADM

## 2025-07-09 RX ORDER — 0.9 % SODIUM CHLORIDE 0.9 %
1000 INTRAVENOUS SOLUTION INTRAVENOUS ONCE
Status: COMPLETED | OUTPATIENT
Start: 2025-07-09 | End: 2025-07-09

## 2025-07-09 RX ORDER — ONDANSETRON 2 MG/ML
4 INJECTION INTRAMUSCULAR; INTRAVENOUS EVERY 6 HOURS PRN
Status: DISCONTINUED | OUTPATIENT
Start: 2025-07-09 | End: 2025-07-10 | Stop reason: HOSPADM

## 2025-07-09 RX ORDER — VALACYCLOVIR HYDROCHLORIDE 500 MG/1
500 TABLET, FILM COATED ORAL DAILY
COMMUNITY
Start: 2025-04-30

## 2025-07-09 RX ORDER — SODIUM CHLORIDE 0.9 % (FLUSH) 0.9 %
5-40 SYRINGE (ML) INJECTION PRN
Status: DISCONTINUED | OUTPATIENT
Start: 2025-07-09 | End: 2025-07-10 | Stop reason: HOSPADM

## 2025-07-09 RX ORDER — ACETAMINOPHEN 500 MG
1000 TABLET ORAL
Status: COMPLETED | OUTPATIENT
Start: 2025-07-09 | End: 2025-07-09

## 2025-07-09 RX ORDER — POTASSIUM CHLORIDE 750 MG/1
40 TABLET, EXTENDED RELEASE ORAL PRN
Status: DISCONTINUED | OUTPATIENT
Start: 2025-07-09 | End: 2025-07-10 | Stop reason: HOSPADM

## 2025-07-09 RX ORDER — ACETAMINOPHEN 325 MG/1
650 TABLET ORAL EVERY 6 HOURS PRN
Status: DISCONTINUED | OUTPATIENT
Start: 2025-07-09 | End: 2025-07-10 | Stop reason: HOSPADM

## 2025-07-09 RX ORDER — CETIRIZINE HYDROCHLORIDE 5 MG/1
5 TABLET ORAL DAILY
COMMUNITY

## 2025-07-09 RX ORDER — MAGNESIUM SULFATE IN WATER 40 MG/ML
2000 INJECTION, SOLUTION INTRAVENOUS PRN
Status: DISCONTINUED | OUTPATIENT
Start: 2025-07-09 | End: 2025-07-10 | Stop reason: HOSPADM

## 2025-07-09 RX ORDER — POTASSIUM CHLORIDE 750 MG/1
40 TABLET, EXTENDED RELEASE ORAL ONCE
Status: COMPLETED | OUTPATIENT
Start: 2025-07-09 | End: 2025-07-09

## 2025-07-09 RX ORDER — ONDANSETRON 4 MG/1
4 TABLET, ORALLY DISINTEGRATING ORAL EVERY 8 HOURS PRN
Status: DISCONTINUED | OUTPATIENT
Start: 2025-07-09 | End: 2025-07-10 | Stop reason: HOSPADM

## 2025-07-09 RX ORDER — POTASSIUM CHLORIDE 7.45 MG/ML
10 INJECTION INTRAVENOUS PRN
Status: DISCONTINUED | OUTPATIENT
Start: 2025-07-09 | End: 2025-07-10 | Stop reason: HOSPADM

## 2025-07-09 RX ORDER — MORPHINE SULFATE 2 MG/ML
2 INJECTION, SOLUTION INTRAMUSCULAR; INTRAVENOUS EVERY 4 HOURS PRN
Refills: 0 | Status: DISCONTINUED | OUTPATIENT
Start: 2025-07-09 | End: 2025-07-10 | Stop reason: HOSPADM

## 2025-07-09 RX ORDER — KETOROLAC TROMETHAMINE 30 MG/ML
15 INJECTION, SOLUTION INTRAMUSCULAR; INTRAVENOUS EVERY 6 HOURS PRN
Status: DISCONTINUED | OUTPATIENT
Start: 2025-07-09 | End: 2025-07-10 | Stop reason: HOSPADM

## 2025-07-09 RX ADMIN — CLINDAMYCIN PHOSPHATE 300 MG: 300 INJECTION, SOLUTION INTRAVENOUS at 18:53

## 2025-07-09 RX ADMIN — SODIUM CHLORIDE 1000 ML: 0.9 INJECTION, SOLUTION INTRAVENOUS at 15:43

## 2025-07-09 RX ADMIN — DEXAMETHASONE SODIUM PHOSPHATE 10 MG: 10 INJECTION, SOLUTION INTRAMUSCULAR; INTRAVENOUS at 15:46

## 2025-07-09 RX ADMIN — POTASSIUM CHLORIDE 40 MEQ: 750 TABLET, FILM COATED, EXTENDED RELEASE ORAL at 17:52

## 2025-07-09 RX ADMIN — WATER 1000 MG: 1 INJECTION INTRAMUSCULAR; INTRAVENOUS; SUBCUTANEOUS at 18:46

## 2025-07-09 RX ADMIN — ACETAMINOPHEN 1000 MG: 500 TABLET ORAL at 15:37

## 2025-07-09 RX ADMIN — KETOROLAC TROMETHAMINE 30 MG: 30 INJECTION, SOLUTION INTRAMUSCULAR; INTRAVENOUS at 15:44

## 2025-07-09 RX ADMIN — AMPICILLIN SODIUM, SULBACTAM SODIUM 3000 MG: 2; 1 INJECTION, POWDER, FOR SOLUTION INTRAMUSCULAR; INTRAVENOUS at 15:57

## 2025-07-09 RX ADMIN — KETOROLAC TROMETHAMINE 15 MG: 30 INJECTION, SOLUTION INTRAMUSCULAR; INTRAVENOUS at 23:12

## 2025-07-09 RX ADMIN — IOPAMIDOL 100 ML: 612 INJECTION, SOLUTION INTRAVENOUS at 16:44

## 2025-07-09 ASSESSMENT — PAIN SCALES - GENERAL
PAINLEVEL_OUTOF10: 10
PAINLEVEL_OUTOF10: 5

## 2025-07-09 ASSESSMENT — PAIN DESCRIPTION - LOCATION: LOCATION: THROAT

## 2025-07-09 NOTE — ED TRIAGE NOTES
Pt c/o severe tonsil pain and right ear pain since Monday. Pt states that she went to [facility] and they put her on abx. Pt states that symptoms have only gotten worse.

## 2025-07-09 NOTE — PROGRESS NOTES
Otolaryngology On-Call Note:    I received a call from Nasim Fang PA-c at [facility] regarding this patient who has difficulty tolerating PO and a multiloculated right tonsillar abscess. WBC 13.7. I reviewed the CT neck images and recommended admission for IV antibiotics (Clinda and Rocephin). She can have a diet as tolerated. Full consult to follow the transfer to [facility].    Thank you for allowing me to participate in the care of your patient.    Jeff Claudio D.O.  Otolaryngology Locum

## 2025-07-09 NOTE — ED PROVIDER NOTES
[facility] EMERGENCY DEPARTMENT  EMERGENCY DEPARTMENT ENCOUNTER      Pt Name: Jon Holman  MRN: [id number]  Birthdate 1995  Date of evaluation: 7/9/2025  Provider: Nasim Fang PA-C    CHIEF COMPLAINT       Chief Complaint   Patient presents with    Pharyngitis    Ear Pain         HISTORY OF PRESENT ILLNESS   (Location/Symptom, Timing/Onset, Context/Setting, Quality, Duration, Modifying Factors, Severity)  Note limiting factors.   This is a 30-year-old female with history of PCOS presenting due to sore throat x 2 days.  She reports that she woke up on Monday with right-sided throat and ear pain.  The pain was severe [facility]  Reportedly her COVID, flu, and influenza test were negative.  She was prescribed diclofenac, azithromycin, and cyclobenzaprine.  She reports the pain worsened which prompted her to present to the emergency room.     She thinks her voice is slightly muffled and she prefers to spit her saliva rather than swallow it due to pain.  She does not feel like her breathing or swallowing is obstructed.    She otherwise denies shortness of breath or injuries to the throat.            Review of External Medical Records:     Nursing Notes were reviewed.    REVIEW OF SYSTEMS    (2-9 systems for level 4, 10 or more for level 5)     Review of Systems    Except as noted above the remainder of the review of systems was reviewed and negative.       PAST MEDICAL HISTORY     Past Medical History:   Diagnosis Date    Herpes simplex type 1 infection     PCOS (polycystic ovarian syndrome)     Seasonal allergies          SURGICAL HISTORY       Past Surgical History:   Procedure Laterality Date    WRIST SURGERY           CURRENT MEDICATIONS       Previous Medications    CETIRIZINE (ZYRTEC) 5 MG TABLET    Take 1 tablet by mouth daily    METFORMIN (GLUCOPHAGE) 500 MG TABLET    Take 1 tablet by mouth 2 times daily (with meals)       ALLERGIES     Patient has no known    Neurological:      General: No focal deficit present.      Mental Status: She is alert and oriented to person, place, and time. Mental status is at baseline.   Psychiatric:         Mood and Affect: Mood normal.         Behavior: Behavior normal.         DIAGNOSTIC RESULTS     EKG: All EKG's are interpreted by the Emergency Department Physician who either signs or Co-signs this chart in the absence of a cardiologist.        RADIOLOGY:   Non-plain film images such as CT, Ultrasound and MRI are read by the radiologist. Plain radiographic images are visualized and preliminarily interpreted by the emergency physician with the below findings:        Interpretation per the Radiologist below, if available at the time of this note:    CT SOFT TISSUE NECK W CONTRAST   Final Result   Multiloculated right palatine tonsillar abscess.      Electronically signed by Vadim Hodges           LABS:  Labs Reviewed   CBC WITH AUTO DIFFERENTIAL - Abnormal; Notable for the following components:       Result Value    WBC 13.7 (*)     Neutrophils % 80.5 (*)     Lymphocytes % 10.2 (*)     Neutrophils Absolute 11.04 (*)     Immature Granulocytes Absolute 0.06 (*)     All other components within normal limits   BASIC METABOLIC PANEL - Abnormal; Notable for the following components:    Potassium 3.0 (*)     BUN/Creatinine Ratio 11 (*)     All other components within normal limits   STREP A, PCR   EXTRA TUBES HOLD       All other labs were within normal range or not returned as of this dictation.    EMERGENCY DEPARTMENT COURSE and DIFFERENTIAL DIAGNOSIS/MDM:   Vitals:    Vitals:    07/09/25 1552 07/09/25 1600 07/09/25 1700 07/09/25 2000   BP: (!) 143/102 (!) 142/96 (!) 141/99 (!) 139/97   Pulse: 90  100    Resp:       Temp:   99.1 °F (37.3 °C) 98.5 °F (36.9 °C)   TempSrc:   Tympanic Tympanic   SpO2: 100% 100% 98% 98%   Weight:       Height:               Medical Decision Making  This is a 30-year-old female with history of PCOS presenting

## 2025-07-09 NOTE — ED NOTES
Bedside and Verbal shift change report given to MARYLOU De La Torre (oncoming nurse) by MARYLOU Pierre (offgoing nurse). Report included the following information Nurse Handoff Report, ED Encounter Summary, ED SBAR, Recent Results, and Neuro Assessment.

## 2025-07-10 VITALS
RESPIRATION RATE: 16 BRPM | DIASTOLIC BLOOD PRESSURE: 100 MMHG | WEIGHT: 167 LBS | OXYGEN SATURATION: 98 % | BODY MASS INDEX: 29.59 KG/M2 | HEIGHT: 63 IN | HEART RATE: 103 BPM | TEMPERATURE: 98.1 F | SYSTOLIC BLOOD PRESSURE: 141 MMHG

## 2025-07-10 PROCEDURE — 2500000003 HC RX 250 WO HCPCS: Performed by: STUDENT IN AN ORGANIZED HEALTH CARE EDUCATION/TRAINING PROGRAM

## 2025-07-10 PROCEDURE — 96376 TX/PRO/DX INJ SAME DRUG ADON: CPT

## 2025-07-10 PROCEDURE — 6370000000 HC RX 637 (ALT 250 FOR IP): Performed by: NURSE PRACTITIONER

## 2025-07-10 PROCEDURE — 99254 IP/OBS CNSLTJ NEW/EST MOD 60: CPT | Performed by: OTOLARYNGOLOGY

## 2025-07-10 PROCEDURE — G0378 HOSPITAL OBSERVATION PER HR: HCPCS

## 2025-07-10 PROCEDURE — 6360000002 HC RX W HCPCS: Performed by: STUDENT IN AN ORGANIZED HEALTH CARE EDUCATION/TRAINING PROGRAM

## 2025-07-10 PROCEDURE — 96366 THER/PROPH/DIAG IV INF ADDON: CPT

## 2025-07-10 RX ORDER — POTASSIUM CHLORIDE 750 MG/1
40 TABLET, EXTENDED RELEASE ORAL ONCE
Status: COMPLETED | OUTPATIENT
Start: 2025-07-10 | End: 2025-07-10

## 2025-07-10 RX ORDER — CLINDAMYCIN HYDROCHLORIDE 300 MG/1
300 CAPSULE ORAL 3 TIMES DAILY
Qty: 21 CAPSULE | Refills: 0 | Status: SHIPPED | OUTPATIENT
Start: 2025-07-10 | End: 2025-07-17

## 2025-07-10 RX ADMIN — POTASSIUM CHLORIDE 40 MEQ: 750 TABLET, FILM COATED, EXTENDED RELEASE ORAL at 11:26

## 2025-07-10 RX ADMIN — SODIUM CHLORIDE, PRESERVATIVE FREE 10 ML: 5 INJECTION INTRAVENOUS at 09:45

## 2025-07-10 RX ADMIN — CLINDAMYCIN PHOSPHATE 600 MG: 600 INJECTION, SOLUTION INTRAVENOUS at 01:59

## 2025-07-10 RX ADMIN — CLINDAMYCIN PHOSPHATE 600 MG: 600 INJECTION, SOLUTION INTRAVENOUS at 09:40

## 2025-07-10 RX ADMIN — KETOROLAC TROMETHAMINE 15 MG: 30 INJECTION, SOLUTION INTRAMUSCULAR; INTRAVENOUS at 09:49

## 2025-07-10 RX ADMIN — WATER 2000 MG: 1 INJECTION INTRAMUSCULAR; INTRAVENOUS; SUBCUTANEOUS at 09:39

## 2025-07-10 ASSESSMENT — PAIN SCALES - GENERAL: PAINLEVEL_OUTOF10: 3

## 2025-07-10 ASSESSMENT — PAIN DESCRIPTION - LOCATION: LOCATION: THROAT

## 2025-07-10 NOTE — DISCHARGE INSTRUCTIONS
Discharge Instructions       PATIENT ID: Jon Holman  MRN: [id number]   YOB: 1995    DATE OF ADMISSION: 7/9/2025   DATE OF DISCHARGE: 7/10/2025    PRIMARY CARE PROVIDER: None, None     ATTENDING PHYSICIAN: Steve Nelson MD   DISCHARGING PROVIDER: WILLIAM Huffman NP    To contact this individual call [phone] and ask the  to page.   If unavailable ask to be transferred the Adult Hospitalist Department.    DISCHARGE DIAGNOSES     Adilia-tonsillar Abscess- Tonsillitis     CONSULTATIONS:    ENT     PROCEDURES/SURGERIES: * No surgery found *    PENDING TEST RESULTS:   At the time of discharge the following test results are still pending: none    FOLLOW UP APPOINTMENTS:     ENT as needed     ADDITIONAL CARE RECOMMENDATIONS:     Continue antibiotics for 7 additional days as prescribed   Follow up with ENT if tonsillitis persists or returns after this time   Drink plenty of fluids, and gatorade/powerade to stay hydrated, your potassium was slightly low this morning  I recommend taking a probiotic while you are on antibiotics to prevent GI upset/diarrhea     DIET: regular diet  Oral Nutritional Supplements: none     ACTIVITY: activity as tolerated    WOUND CARE: none    EQUIPMENT needed: none      DISCHARGE MEDICATIONS:   See Medication Reconciliation Form    It is important that you take the medication exactly as they are prescribed.   Keep your medication in the bottles provided by the pharmacist and keep a list of the medication names, dosages, and times to be taken in your wallet.   Do not take other medications without consulting your doctor.       NOTIFY YOUR PHYSICIAN FOR ANY OF THE FOLLOWING:   Fever over 101 degrees for 24 hours.   Chest pain, shortness of breath, fever, chills, nausea, vomiting, diarrhea, change in mentation, falling, weakness, bleeding. Severe pain or pain not relieved by medications.  Or, any other signs or symptoms that you may have questions

## 2025-07-10 NOTE — CARE COORDINATION
Transition of Care: home with followup with specialists/pcp    Transport Plan: pt needs ride back to the [facility] parking lot where her car is (pt transferred from [facility] on 7/9 to [facility]) - H2H ambulatory ride scheduled for 12 noon pickup; pt states she has her car keys; timely discharge;  $70 ( east)    RUR: obs    1130: pt stating to bedside RN and attending that her car is currently in the [facility] parking lot and she needs ride back to it at discharge today; (pt was transferred to [facility] from the [facility] on 7/9) H2H ambulatory ride set up for pt with 12 noon pickup back to her car; pt states she has her car keys with her    CM following   Olimpia Parker RN

## 2025-07-10 NOTE — ED NOTES
TRANSFER - OUT REPORT:    Verbal report given to MARYLOU ROMO on Jon Holman  being transferred to [address] for routine progression of patient care       Report consisted of patient's Situation, Background, Assessment and   Recommendations(SBAR).     Information from the following report(s) Nurse Handoff Report, ED Encounter Summary, ED SBAR, MAR, Recent Results, and Neuro Assessment was reviewed with the receiving nurse.    [place] Assessment:    Presents to emergency department  because of falls (Syncope, seizure, or loss of consciousness): No  Age > 70: No  Altered Mental Status, Intoxication with alcohol or substance confusion (Disorientation, impaired judgment, poor safety awaremess, or inability to follow instructions): No  Impaired Mobility: Ambulates or transfers with assistive devices or assistance; Unable to ambulate or transer.: No  Nursing Judgement: No          Lines:   Peripheral IV 07/09/25 Right Antecubital (Active)        Opportunity for questions and clarification was provided.

## 2025-07-10 NOTE — H&P
History & Physical    Primary Care Provider: None, None  Source of Information: Patient and chart review    History of Presenting Illness:   Jon Holman is a 30 y.o. female with hx of PCOS, HSV who presented to ed with complaints sore throat x 2 days.  She reports that she woke up on Monday with right-sided throat and ear pain.  The pain was severe so she [facility].  Reportedly her COVID, flu, and influenza test were negative.  She was prescribed diclofenac, azithromycin, and cyclobenzaprine.  She reports the pain worsened which prompted her to present to the emergency room.   No dysphagia.  Has mild odynophagia.  No dyspnea.    The patient denies any fever, chills, chest or abdominal pain, nausea, vomiting, cough, congestion, recent illness, palpitations, or dysuria.  Remarkable vitals on ER Presentation: hr to 107  Labs Remarkable for: k-3.0, wbc 13.7  ER Images: ct neck and soft tissue: Multiloculated right palatine tonsillar abscess.  ER Rx: unasyn, clinda, rocephin, decadron 10mg, 1l ns bolus, toradol, tylenol, k-40 meq     Review of Systems:  Pertinent items are noted in the History of Present Illness.     Past Medical History:   Diagnosis Date    Herpes simplex type 1 infection     PCOS (polycystic ovarian syndrome)     Seasonal allergies       Past Surgical History:   Procedure Laterality Date    WRIST SURGERY       Prior to Admission medications    Medication Sig Start Date End Date Taking? Authorizing Provider   metFORMIN (GLUCOPHAGE) 500 MG tablet Take 1 tablet by mouth 2 times daily (with meals)   Yes ProviderJessica MD   cetirizine (ZYRTEC) 5 MG tablet Take 1 tablet by mouth daily   Yes ProviderJessica MD     No Known Allergies   History reviewed. No pertinent family history.     SOCIAL HISTORY:  Patient resides:  Independently x   Assisted Living    SNF    With family care       Smoking history:   None x   Former    Chronic      Alcohol history:   None x   Social     Chronic      Ambulates:   Independently x   w/cane    w/walker    w/wc    CODE STATUS:  DNR    Full x   Other      Objective:     Physical Exam:     /88   Pulse 86   Temp 98.4 °F (36.9 °C) (Oral)   Resp 18   Ht 1.6 m (5' 3\")   Wt 75.8 kg (167 lb)   LMP 06/16/2025   SpO2 99%   BMI 29.58 kg/m²         General:  Alert, cooperative, no distress, appears stated age.   Head:  Mildly tender right cervical and submental lymphadenopathy   Eyes:  Conjunctivae/corneas clear. PERRL, EOMs intact.   Nose: Nares normal. Septum midline. Mucosa normal.        Neck: Supple, symmetrical, trachea midline.       Lungs:   Clear to auscultation bilaterally.   Chest wall:  No tenderness or deformity.   Heart:  Regular rate and rhythm, S1, S2 normal   Abdomen:   Soft, non-tender. Bowel sounds normal. No masses,  No organomegaly.   Extremities: Extremities normal, atraumatic, no cyanosis or edema.   Pulses: 2+ and symmetric all extremities.   Skin: Skin color, texture, turgor normal. No rashes or lesions   Neurologic: CNII-XII grossly intact.        Data Review:     Recent Days:  Recent Labs     07/09/25  1543   WBC 13.7*   HGB 11.7   HCT 35.2        Recent Labs     07/09/25  1543      K 3.0*      CO2 26   BUN 8     No results for input(s): \"PH\", \"PCO2\", \"PO2\", \"HCO3\", \"FIO2\" in the last 72 hours.    24 Hour Results:  Recent Results (from the past 24 hours)   Group A Strep by PCR    Collection Time: 07/09/25  2:44 PM    Specimen: Swab; Throat   Result Value Ref Range    Strep Grp A PCR Not detected NOTD     CBC with Auto Differential    Collection Time: 07/09/25  3:43 PM   Result Value Ref Range    WBC 13.7 (H) 3.6 - 11.0 K/uL    RBC 3.84 3.80 - 5.20 M/uL    Hemoglobin 11.7 11.5 - 16.0 g/dL    Hematocrit 35.2 35.0 - 47.0 %    MCV 91.7 80.0 - 99.0 FL    MCH 30.5 26.0 - 34.0 PG    MCHC 33.2 30.0 - 36.5 g/dL    RDW 12.7 11.5 - 14.5 %    Platelets 293 150 - 400 K/uL    MPV 9.9 8.9 - 12.9 FL    Nucleated RBCs

## 2025-07-10 NOTE — ED NOTES
Attempted to call report to [facility]. Reported they will call back as they are in the middle of shift change.

## 2025-07-10 NOTE — CONSULTS
Otolaryngology Consult Note      Reason for Consult:  Tonsillar abscess    Requesting Physician:  Dr. Nelson    CHIEF COMPLAINT:  \"I couldn't swallow yesterday\"    History Obtained From:  patient    HISTORY OF PRESENT ILLNESS:                The patient is a 30 y.o. female with significant past medical history of HSV1 and POCOS and allergic rhinitis on Loratadine who presents with a sore throat starting last Thursday/Friday while at a [employer] event. She denies a history of tonsil infection. Never has had a tonsil abscess. Her pain was 10/10 yesterday but today she feels 2/10. Denies otalgia. Denies trismus and was able to tolerate PO diet this morning. Denies fever/nausea.    Past Medical History:        Diagnosis Date    Herpes simplex type 1 infection     PCOS (polycystic ovarian syndrome)     Seasonal allergies      Past Surgical History:        Procedure Laterality Date    WRIST SURGERY       Current Medications:   Current Facility-Administered Medications: cefTRIAXone (ROCEPHIN) 2,000 mg in sterile water 20 mL IV syringe, 2,000 mg, IntraVENous, Q24H  clindamycin (CLEOCIN) 600 mg in dextrose 5 % 50 mL IVPB, 600 mg, IntraVENous, Q8H  ketorolac (TORADOL) injection 15 mg, 15 mg, IntraVENous, Q6H PRN  sodium chloride flush 0.9 % injection 5-40 mL, 5-40 mL, IntraVENous, 2 times per day  sodium chloride flush 0.9 % injection 5-40 mL, 5-40 mL, IntraVENous, PRN  0.9 % sodium chloride infusion, , IntraVENous, PRN  potassium chloride (KLOR-CON) extended release tablet 40 mEq, 40 mEq, Oral, PRN **OR** potassium bicarb-citric acid (EFFER-K) effervescent tablet 40 mEq, 40 mEq, Oral, PRN **OR** potassium chloride 10 mEq/100 mL IVPB (Peripheral Line), 10 mEq, IntraVENous, PRN  magnesium sulfate 2000 mg in 50 mL IVPB premix, 2,000 mg, IntraVENous, PRN  ondansetron (ZOFRAN-ODT) disintegrating tablet 4 mg, 4 mg, Oral, Q8H PRN **OR** ondansetron (ZOFRAN) injection 4 mg, 4 mg, IntraVENous, Q6H PRN  polyethylene glycol (GLYCOLAX)

## 2025-07-10 NOTE — PROGRESS NOTES
7/10/2025        RE: Jon Holman         [address] [place] [id number]          To Whom It May Concern,      Due to medical reasons, Jon Holman was admitted to [facility] from 7/9/2025-7/10/2025. She will be medically stable to return to work on Monday July 14th 2025.         Sincerely,          WILLIAM Huffman NP   [facility]   [phone]

## 2025-07-11 ENCOUNTER — OFFICE VISIT (OUTPATIENT)
Age: 30
End: 2025-07-11
Payer: COMMERCIAL

## 2025-07-11 VITALS
RESPIRATION RATE: 16 BRPM | BODY MASS INDEX: 30.87 KG/M2 | OXYGEN SATURATION: 100 % | DIASTOLIC BLOOD PRESSURE: 90 MMHG | HEIGHT: 63 IN | TEMPERATURE: 97.3 F | SYSTOLIC BLOOD PRESSURE: 140 MMHG | WEIGHT: 174.2 LBS | HEART RATE: 91 BPM

## 2025-07-11 DIAGNOSIS — R03.0 ELEVATED BLOOD PRESSURE READING IN OFFICE WITHOUT DIAGNOSIS OF HYPERTENSION: ICD-10-CM

## 2025-07-11 DIAGNOSIS — B00.9 HSV-1 INFECTION: ICD-10-CM

## 2025-07-11 DIAGNOSIS — E28.2 PCOS (POLYCYSTIC OVARIAN SYNDROME): ICD-10-CM

## 2025-07-11 DIAGNOSIS — Z00.00 ROUTINE GENERAL MEDICAL EXAMINATION AT A HEALTH CARE FACILITY: Primary | ICD-10-CM

## 2025-07-11 DIAGNOSIS — J30.1 SEASONAL ALLERGIC RHINITIS DUE TO POLLEN: ICD-10-CM

## 2025-07-11 DIAGNOSIS — Z00.00 ROUTINE GENERAL MEDICAL EXAMINATION AT A HEALTH CARE FACILITY: ICD-10-CM

## 2025-07-11 DIAGNOSIS — J36 PERITONSILLAR ABSCESS: ICD-10-CM

## 2025-07-11 PROCEDURE — 99204 OFFICE O/P NEW MOD 45 MIN: CPT

## 2025-07-11 RX ORDER — LORATADINE 10 MG/1
10 TABLET ORAL DAILY
COMMUNITY

## 2025-07-11 RX ORDER — METFORMIN HYDROCHLORIDE 500 MG/1
500 TABLET, EXTENDED RELEASE ORAL 2 TIMES DAILY
COMMUNITY
Start: 2025-04-24

## 2025-07-11 RX ORDER — CLINDAMYCIN HYDROCHLORIDE 300 MG/1
300 CAPSULE ORAL 3 TIMES DAILY
COMMUNITY
Start: 2025-07-10

## 2025-07-11 RX ORDER — VALACYCLOVIR HYDROCHLORIDE 500 MG/1
500 TABLET, FILM COATED ORAL DAILY
COMMUNITY
Start: 2025-04-30

## 2025-07-11 SDOH — ECONOMIC STABILITY: FOOD INSECURITY: WITHIN THE PAST 12 MONTHS, YOU WORRIED THAT YOUR FOOD WOULD RUN OUT BEFORE YOU GOT MONEY TO BUY MORE.: NEVER TRUE

## 2025-07-11 SDOH — ECONOMIC STABILITY: FOOD INSECURITY: WITHIN THE PAST 12 MONTHS, THE FOOD YOU BOUGHT JUST DIDN'T LAST AND YOU DIDN'T HAVE MONEY TO GET MORE.: NEVER TRUE

## 2025-07-11 ASSESSMENT — ENCOUNTER SYMPTOMS
ABDOMINAL PAIN: 0
VOMITING: 0
SHORTNESS OF BREATH: 0
NAUSEA: 0
SINUS PAIN: 0
CONSTIPATION: 0
BACK PAIN: 0
SORE THROAT: 0
CHEST TIGHTNESS: 0
DIARRHEA: 0
COUGH: 0
WHEEZING: 0

## 2025-07-11 ASSESSMENT — PATIENT HEALTH QUESTIONNAIRE - PHQ9
SUM OF ALL RESPONSES TO PHQ QUESTIONS 1-9: 0
1. LITTLE INTEREST OR PLEASURE IN DOING THINGS: NOT AT ALL
2. FEELING DOWN, DEPRESSED OR HOPELESS: NOT AT ALL
SUM OF ALL RESPONSES TO PHQ QUESTIONS 1-9: 0

## 2025-07-11 NOTE — PATIENT INSTRUCTIONS
You can use the lab on the 1st floor of my office.  It is open Monday thru Friday, 7am to 5pm.  Labs have been ordered so you can just show up.      Checking your blood pressure at home:    Your blood pressure was either borderline or to high.  Please check your blood pressure 2 times per day.  [place] BP is 120/80.  Your BP should be under 140 for the top number and 90 for the bottom number.  Please update me either by calling the office (phone number: [phone]) or you can use [facility].

## 2025-07-11 NOTE — ASSESSMENT & PLAN NOTE
Chronic, at goal (stable), continue current plan pending work up below    Orders:    Hemoglobin A1C; Future    Lipid Panel; Future

## 2025-07-11 NOTE — PROGRESS NOTES
Farshad Churchill is a 30 y.o. year old female who is a new patient to me today (07/11/25). She was previous followed by Lauren Brunner.    Assessment & Plan:   Below is the assessment and plan developed based on review of pertinent history, physical exam, labs, studies, and medications.    Assessment & Plan  Routine general medical examination at a health care facility   Routine labs below    Orders:    Comprehensive Metabolic Panel; Future    CBC with Auto Differential; Future    TSH; Future    T4, Free; Future    Elevated blood pressure reading in office without diagnosis of hypertension   Chronic, at goal (stable), Notable family history of hypertension further c/b gestational HTN and PCOS. At much higher risk for early-onset hypertension with this. Will check labs below for metabolic evaluation. Advised to keep log of readings at home and follow-up in 3m.    Orders:    Comprehensive Metabolic Panel; Future    TSH; Future    T4, Free; Future    Aldosterone & Renin, Direct with Ratio; Future    Albumin/Creatinine Ratio, Urine; Future    PCOS (polycystic ovarian syndrome)   Chronic, at goal (stable), continue current plan pending work up below    Orders:    Hemoglobin A1C; Future    Lipid Panel; Future    Peritonsillar abscess   Acute condition, new, Continue abx as recommended by ENT.         HSV-1 infection   Chronic, at goal (stable), Continue Valtrex PRN through Gyn         Seasonal allergic rhinitis due to pollen   Chronic, at goal (stable), continue current treatment plan           RTC 3M for BP follow-up    Subjective/Objective:   Farshad was seen today for New Patient (Pt here to established care. Pt also recently had hosp visit r/t cyst on tonsil. Want to discussed htn)     Farshad is a 30 year-old woman with medical history of PCOS, migraines, allergic rhinitis, recent hospital admission for peritonsillar abscess (7/9-7/10) who presents to establish care.    Originally from [facility], family in the

## 2025-07-13 LAB
ALBUMIN SERPL-MCNC: 3.5 G/DL (ref 3.5–5)
ALBUMIN/GLOB SERPL: 1.1 (ref 1.1–2.2)
ALP SERPL-CCNC: 57 U/L (ref 45–117)
ALT SERPL-CCNC: 24 U/L (ref 12–78)
ANION GAP SERPL CALC-SCNC: 3 MMOL/L (ref 2–12)
AST SERPL-CCNC: 14 U/L (ref 15–37)
BASOPHILS # BLD: 0.04 K/UL (ref 0–0.1)
BASOPHILS NFR BLD: 0.5 % (ref 0–1)
BILIRUB SERPL-MCNC: 0.2 MG/DL (ref 0.2–1)
BUN SERPL-MCNC: 7 MG/DL (ref 6–20)
BUN/CREAT SERPL: 9 (ref 12–20)
CALCIUM SERPL-MCNC: 8.5 MG/DL (ref 8.5–10.1)
CHLORIDE SERPL-SCNC: 111 MMOL/L (ref 97–108)
CHOLEST SERPL-MCNC: 109 MG/DL
CO2 SERPL-SCNC: 24 MMOL/L (ref 21–32)
CREAT SERPL-MCNC: 0.8 MG/DL (ref 0.55–1.02)
CREAT UR-MCNC: 109 MG/DL
DIFFERENTIAL METHOD BLD: ABNORMAL
EOSINOPHIL # BLD: 0.18 K/UL (ref 0–0.4)
EOSINOPHIL NFR BLD: 2.3 % (ref 0–7)
ERYTHROCYTE [DISTWIDTH] IN BLOOD BY AUTOMATED COUNT: 12.9 % (ref 11.5–14.5)
EST. AVERAGE GLUCOSE BLD GHB EST-MCNC: 103 MG/DL
GLOBULIN SER CALC-MCNC: 3.2 G/DL (ref 2–4)
GLUCOSE SERPL-MCNC: 128 MG/DL (ref 65–100)
HBA1C MFR BLD: 5.2 % (ref 4–5.6)
HCT VFR BLD AUTO: 33.7 % (ref 35–47)
HDLC SERPL-MCNC: 36 MG/DL
HDLC SERPL: 3 (ref 0–5)
HGB BLD-MCNC: 10.8 G/DL (ref 11.5–16)
IMM GRANULOCYTES # BLD AUTO: 0.05 K/UL (ref 0–0.04)
IMM GRANULOCYTES NFR BLD AUTO: 0.7 % (ref 0–0.5)
LDLC SERPL CALC-MCNC: 55.2 MG/DL (ref 0–100)
LYMPHOCYTES # BLD: 3.01 K/UL (ref 0.8–3.5)
LYMPHOCYTES NFR BLD: 39.1 % (ref 12–49)
MCH RBC QN AUTO: 30.6 PG (ref 26–34)
MCHC RBC AUTO-ENTMCNC: 32 G/DL (ref 30–36.5)
MCV RBC AUTO: 95.5 FL (ref 80–99)
MICROALBUMIN UR-MCNC: 0.99 MG/DL
MICROALBUMIN/CREAT UR-RTO: 9 MG/G (ref 0–30)
MONOCYTES # BLD: 0.45 K/UL (ref 0–1)
MONOCYTES NFR BLD: 5.9 % (ref 5–13)
NEUTS SEG # BLD: 3.96 K/UL (ref 1.8–8)
NEUTS SEG NFR BLD: 51.5 % (ref 32–75)
NRBC # BLD: 0 K/UL (ref 0–0.01)
NRBC BLD-RTO: 0 PER 100 WBC
PLATELET # BLD AUTO: 341 K/UL (ref 150–400)
PMV BLD AUTO: 9.9 FL (ref 8.9–12.9)
POTASSIUM SERPL-SCNC: 3.7 MMOL/L (ref 3.5–5.1)
PROT SERPL-MCNC: 6.7 G/DL (ref 6.4–8.2)
RBC # BLD AUTO: 3.53 M/UL (ref 3.8–5.2)
SODIUM SERPL-SCNC: 138 MMOL/L (ref 136–145)
SPECIMEN HOLD: NORMAL
T4 FREE SERPL-MCNC: 1.2 NG/DL (ref 0.8–1.5)
TRIGL SERPL-MCNC: 89 MG/DL
TSH SERPL DL<=0.05 MIU/L-ACNC: 1.91 UIU/ML (ref 0.36–3.74)
VLDLC SERPL CALC-MCNC: 17.8 MG/DL
WBC # BLD AUTO: 7.7 K/UL (ref 3.6–11)

## 2025-07-15 DIAGNOSIS — D64.9 ANEMIA, UNSPECIFIED TYPE: Primary | ICD-10-CM

## 2025-07-18 LAB
ALDOST SERPL-MCNC: 1.7 NG/DL (ref 0–30)
ALDOST/RENIN PLAS-RTO: >10.2 {RATIO} (ref 0–30)
FERRITIN SERPL-MCNC: 125 NG/ML (ref 8–252)
IRON SATN MFR SERPL: 31 % (ref 20–50)
IRON SERPL-MCNC: 81 UG/DL (ref 35–150)
RENIN PLAS-CCNC: <0.167 NG/ML/HR (ref 0.17–5.38)
TIBC SERPL-MCNC: 264 UG/DL (ref 250–450)